# Patient Record
Sex: FEMALE | Race: WHITE | NOT HISPANIC OR LATINO | URBAN - METROPOLITAN AREA
[De-identification: names, ages, dates, MRNs, and addresses within clinical notes are randomized per-mention and may not be internally consistent; named-entity substitution may affect disease eponyms.]

---

## 2019-08-17 ENCOUNTER — INPATIENT (INPATIENT)
Facility: HOSPITAL | Age: 69
LOS: 3 days | Discharge: HOME | DRG: 64 | End: 2019-08-21
Attending: HOSPITALIST | Admitting: HOSPITALIST
Payer: MEDICARE

## 2019-08-17 VITALS
DIASTOLIC BLOOD PRESSURE: 54 MMHG | HEART RATE: 55 BPM | RESPIRATION RATE: 18 BRPM | TEMPERATURE: 98 F | SYSTOLIC BLOOD PRESSURE: 105 MMHG

## 2019-08-17 DIAGNOSIS — I10 ESSENTIAL (PRIMARY) HYPERTENSION: ICD-10-CM

## 2019-08-17 DIAGNOSIS — F20.89 OTHER SCHIZOPHRENIA: ICD-10-CM

## 2019-08-17 DIAGNOSIS — Z95.1 PRESENCE OF AORTOCORONARY BYPASS GRAFT: Chronic | ICD-10-CM

## 2019-08-17 DIAGNOSIS — E78.2 MIXED HYPERLIPIDEMIA: ICD-10-CM

## 2019-08-17 DIAGNOSIS — Z96.649 PRESENCE OF UNSPECIFIED ARTIFICIAL HIP JOINT: Chronic | ICD-10-CM

## 2019-08-17 DIAGNOSIS — I63.89 OTHER CEREBRAL INFARCTION: ICD-10-CM

## 2019-08-17 DIAGNOSIS — I48.0 PAROXYSMAL ATRIAL FIBRILLATION: ICD-10-CM

## 2019-08-17 DIAGNOSIS — G45.9 TRANSIENT CEREBRAL ISCHEMIC ATTACK, UNSPECIFIED: ICD-10-CM

## 2019-08-17 DIAGNOSIS — E03.9 HYPOTHYROIDISM, UNSPECIFIED: ICD-10-CM

## 2019-08-17 DIAGNOSIS — F31.9 BIPOLAR DISORDER, UNSPECIFIED: ICD-10-CM

## 2019-08-17 DIAGNOSIS — J15.9 UNSPECIFIED BACTERIAL PNEUMONIA: ICD-10-CM

## 2019-08-17 LAB
ALBUMIN SERPL ELPH-MCNC: 4.3 G/DL — SIGNIFICANT CHANGE UP (ref 3.3–5.2)
ALP SERPL-CCNC: 51 U/L — SIGNIFICANT CHANGE UP (ref 40–120)
ALT FLD-CCNC: 13 U/L — SIGNIFICANT CHANGE UP
ANION GAP SERPL CALC-SCNC: 13 MMOL/L — SIGNIFICANT CHANGE UP (ref 5–17)
APTT BLD: 36.7 SEC — HIGH (ref 27.5–36.3)
AST SERPL-CCNC: 15 U/L — SIGNIFICANT CHANGE UP
BASOPHILS # BLD AUTO: 0.09 K/UL — SIGNIFICANT CHANGE UP (ref 0–0.2)
BASOPHILS NFR BLD AUTO: 0.8 % — SIGNIFICANT CHANGE UP (ref 0–2)
BILIRUB SERPL-MCNC: 0.5 MG/DL — SIGNIFICANT CHANGE UP (ref 0.4–2)
BUN SERPL-MCNC: 22 MG/DL — HIGH (ref 8–20)
CALCIUM SERPL-MCNC: 10.1 MG/DL — SIGNIFICANT CHANGE UP (ref 8.6–10.2)
CHLORIDE SERPL-SCNC: 101 MMOL/L — SIGNIFICANT CHANGE UP (ref 98–107)
CO2 SERPL-SCNC: 23 MMOL/L — SIGNIFICANT CHANGE UP (ref 22–29)
CREAT SERPL-MCNC: 0.6 MG/DL — SIGNIFICANT CHANGE UP (ref 0.5–1.3)
EOSINOPHIL # BLD AUTO: 0.3 K/UL — SIGNIFICANT CHANGE UP (ref 0–0.5)
EOSINOPHIL NFR BLD AUTO: 2.5 % — SIGNIFICANT CHANGE UP (ref 0–6)
GLUCOSE SERPL-MCNC: 129 MG/DL — HIGH (ref 70–115)
HBA1C BLD-MCNC: 5.9 % — HIGH (ref 4–5.6)
HCT VFR BLD CALC: 42.4 % — SIGNIFICANT CHANGE UP (ref 34.5–45)
HGB BLD-MCNC: 13 G/DL — SIGNIFICANT CHANGE UP (ref 11.5–15.5)
IMM GRANULOCYTES NFR BLD AUTO: 0.6 % — SIGNIFICANT CHANGE UP (ref 0–1.5)
INR BLD: 1.11 RATIO — SIGNIFICANT CHANGE UP (ref 0.88–1.16)
LYMPHOCYTES # BLD AUTO: 1.34 K/UL — SIGNIFICANT CHANGE UP (ref 1–3.3)
LYMPHOCYTES # BLD AUTO: 11.3 % — LOW (ref 13–44)
MCHC RBC-ENTMCNC: 26.1 PG — LOW (ref 27–34)
MCHC RBC-ENTMCNC: 30.7 GM/DL — LOW (ref 32–36)
MCV RBC AUTO: 85 FL — SIGNIFICANT CHANGE UP (ref 80–100)
MONOCYTES # BLD AUTO: 0.63 K/UL — SIGNIFICANT CHANGE UP (ref 0–0.9)
MONOCYTES NFR BLD AUTO: 5.3 % — SIGNIFICANT CHANGE UP (ref 2–14)
NEUTROPHILS # BLD AUTO: 9.45 K/UL — HIGH (ref 1.8–7.4)
NEUTROPHILS NFR BLD AUTO: 79.5 % — HIGH (ref 43–77)
PLATELET # BLD AUTO: 259 K/UL — SIGNIFICANT CHANGE UP (ref 150–400)
POTASSIUM SERPL-MCNC: 4.2 MMOL/L — SIGNIFICANT CHANGE UP (ref 3.5–5.3)
POTASSIUM SERPL-SCNC: 4.2 MMOL/L — SIGNIFICANT CHANGE UP (ref 3.5–5.3)
PROT SERPL-MCNC: 7.8 G/DL — SIGNIFICANT CHANGE UP (ref 6.6–8.7)
PROTHROM AB SERPL-ACNC: 12.8 SEC — SIGNIFICANT CHANGE UP (ref 10–12.9)
RBC # BLD: 4.99 M/UL — SIGNIFICANT CHANGE UP (ref 3.8–5.2)
RBC # FLD: 14.9 % — HIGH (ref 10.3–14.5)
SODIUM SERPL-SCNC: 137 MMOL/L — SIGNIFICANT CHANGE UP (ref 135–145)
WBC # BLD: 11.88 K/UL — HIGH (ref 3.8–10.5)
WBC # FLD AUTO: 11.88 K/UL — HIGH (ref 3.8–10.5)

## 2019-08-17 PROCEDURE — 93010 ELECTROCARDIOGRAM REPORT: CPT

## 2019-08-17 PROCEDURE — 99291 CRITICAL CARE FIRST HOUR: CPT

## 2019-08-17 PROCEDURE — 70450 CT HEAD/BRAIN W/O DYE: CPT | Mod: 26,59

## 2019-08-17 PROCEDURE — 99223 1ST HOSP IP/OBS HIGH 75: CPT | Mod: AI

## 2019-08-17 PROCEDURE — 70496 CT ANGIOGRAPHY HEAD: CPT | Mod: 26

## 2019-08-17 PROCEDURE — 70498 CT ANGIOGRAPHY NECK: CPT | Mod: 26

## 2019-08-17 RX ORDER — ASPIRIN/CALCIUM CARB/MAGNESIUM 324 MG
325 TABLET ORAL ONCE
Refills: 0 | Status: COMPLETED | OUTPATIENT
Start: 2019-08-17 | End: 2019-08-17

## 2019-08-17 RX ORDER — LEVOTHYROXINE SODIUM 125 MCG
50 TABLET ORAL DAILY
Refills: 0 | Status: DISCONTINUED | OUTPATIENT
Start: 2019-08-17 | End: 2019-08-21

## 2019-08-17 RX ORDER — ONDANSETRON 8 MG/1
8 TABLET, FILM COATED ORAL ONCE
Refills: 0 | Status: COMPLETED | OUTPATIENT
Start: 2019-08-17 | End: 2019-08-17

## 2019-08-17 RX ORDER — ALTEPLASE 100 MG
5.9 KIT INTRAVENOUS ONCE
Refills: 0 | Status: DISCONTINUED | OUTPATIENT
Start: 2019-08-17 | End: 2019-08-17

## 2019-08-17 RX ORDER — ATORVASTATIN CALCIUM 80 MG/1
40 TABLET, FILM COATED ORAL AT BEDTIME
Refills: 0 | Status: DISCONTINUED | OUTPATIENT
Start: 2019-08-17 | End: 2019-08-21

## 2019-08-17 RX ORDER — CEFTRIAXONE 500 MG/1
1000 INJECTION, POWDER, FOR SOLUTION INTRAMUSCULAR; INTRAVENOUS EVERY 24 HOURS
Refills: 0 | Status: DISCONTINUED | OUTPATIENT
Start: 2019-08-17 | End: 2019-08-21

## 2019-08-17 RX ORDER — LATANOPROST 0.05 MG/ML
1 SOLUTION/ DROPS OPHTHALMIC; TOPICAL AT BEDTIME
Refills: 0 | Status: DISCONTINUED | OUTPATIENT
Start: 2019-08-17 | End: 2019-08-21

## 2019-08-17 RX ORDER — ALTEPLASE 100 MG
53.1 KIT INTRAVENOUS ONCE
Refills: 0 | Status: DISCONTINUED | OUTPATIENT
Start: 2019-08-17 | End: 2019-08-17

## 2019-08-17 RX ORDER — AZITHROMYCIN 500 MG/1
500 TABLET, FILM COATED ORAL EVERY 24 HOURS
Refills: 0 | Status: DISCONTINUED | OUTPATIENT
Start: 2019-08-17 | End: 2019-08-21

## 2019-08-17 RX ORDER — ASPIRIN/CALCIUM CARB/MAGNESIUM 324 MG
300 TABLET ORAL DAILY
Refills: 0 | Status: DISCONTINUED | OUTPATIENT
Start: 2019-08-17 | End: 2019-08-20

## 2019-08-17 RX ORDER — METOPROLOL TARTRATE 50 MG
100 TABLET ORAL DAILY
Refills: 0 | Status: DISCONTINUED | OUTPATIENT
Start: 2019-08-17 | End: 2019-08-17

## 2019-08-17 RX ADMIN — ATORVASTATIN CALCIUM 40 MILLIGRAM(S): 80 TABLET, FILM COATED ORAL at 21:27

## 2019-08-17 RX ADMIN — AZITHROMYCIN 255 MILLIGRAM(S): 500 TABLET, FILM COATED ORAL at 21:27

## 2019-08-17 RX ADMIN — LATANOPROST 1 DROP(S): 0.05 SOLUTION/ DROPS OPHTHALMIC; TOPICAL at 21:26

## 2019-08-17 RX ADMIN — ONDANSETRON 8 MILLIGRAM(S): 8 TABLET, FILM COATED ORAL at 09:48

## 2019-08-17 RX ADMIN — CEFTRIAXONE 100 MILLIGRAM(S): 500 INJECTION, POWDER, FOR SOLUTION INTRAMUSCULAR; INTRAVENOUS at 20:34

## 2019-08-17 RX ADMIN — Medication 325 MILLIGRAM(S): at 10:00

## 2019-08-17 NOTE — H&P ADULT - NSICDXPASTMEDICALHX_GEN_ALL_CORE_FT
PAST MEDICAL HISTORY:  Bipolar 1 disorder     Hyperlipidemia     Hypokalemia     Hypothyroid     Intrinsic eczema     Muscle weakness (generalized)     Schizophrenia

## 2019-08-17 NOTE — H&P ADULT - ASSESSMENT
68 yr female with mental retardation , CAD, CABG, arthritis  presenting with sudden onset slurred speech, mental status changes that resolved and was found to be in AFIB 68 yr female Bipolar and schizophrenia  , CAD, CABG, Hypertension , arthritis  presenting with sudden onset slurred speech, mental status changes that resolved and was found to be in AFIB

## 2019-08-17 NOTE — ED ADULT NURSE NOTE - ED STAT RN HANDOFF DETAILS
report to ZECHARIAH Rascon.  Pt transported to bed A17L and placed on cardiac monitor with continuous pulse ox. family at bedside.

## 2019-08-17 NOTE — ED PROVIDER NOTE - CLINICAL SUMMARY MEDICAL DECISION MAKING FREE TEXT BOX
The patient presents with slurred speech from 7 am but resolved by the time the patient arrived to ED and according her sister the patient is at baseline and stroke Neurologist recommend no TPA and will admit for TIA

## 2019-08-17 NOTE — H&P ADULT - HISTORY OF PRESENT ILLNESS
68 yr female with mental retardation resident of nursing home  presented with sudden onset slurring of speech started 7 am in the morning of admission and resolved by the time she came to the ED.  Patient is legally blind , has debilitating arthritis that  has confined her to a wheelchair, CAD with CABG 2008, a hysterectomy for uterine cancer.  In ED was found to be in Afib- new onset , Leukocytosis 11.8. CTAngio showing tree in bud opacity of right upper lung concerning for infection or pneumonia.  Head CT show no acute CVA or hemorrhage.  CTA head showing multifocal stenosis intracranial arteries. 75% right carotid bulb stenosis. 68 yr female with Bipolar and schizophrenia , resident of  nursing home  presented with sudden onset slurring of speech started 7 am in the morning of admission and resolved by the time she came to the ED.  Patient is legally blind , has debilitating arthritis that  has confined her to a wheelchair, CAD with CABG 2008, Hypertension, a hysterectomy for uterine cancer.  In ED was found to be in Afib- new onset , Leukocytosis 11.8. CTAngio showing tree in bud opacity of right upper lung concerning for infection or pneumonia.  Head CT show no acute CVA or hemorrhage.  CTA head showing multifocal stenosis intracranial arteries. 75% right carotid bulb stenosis.

## 2019-08-17 NOTE — ED ADULT NURSE NOTE - OBJECTIVE STATEMENT
assumed pt care as critical care RN called to code stroke.  pt from [facility] states she had difficulty speaking at 0700 this morning.  No other deficits.  Pt is legally blind.  Pt arrives vomiting bile with right sided facial droop and slurred speech.  Patient's sister arrived to confirm her speech is worse than normal.  During video neurology consult, patients stroke symptoms resolved.  No TPA given.  Pt vomited 3X since arrival.

## 2019-08-17 NOTE — ED ADULT NURSE NOTE - INTERVENTIONS DEFINITIONS
Non-slip footwear when patient is off stretcher/Provide visual clues: red socks/Stretcher in lowest position, wheels locked, appropriate side rails in place

## 2019-08-17 NOTE — ED PROVIDER NOTE - PROGRESS NOTE DETAILS
Sister by bedside stating that the patient is at baseline speech.  Code stroke Neurologist recommend No TPA at this time and admit for TIA Sister by bedside stating that the patient is at baseline speech.  Code stroke Neurologist Dr Garzon recommend No TPA at this time and admit for TIA

## 2019-08-17 NOTE — ED ADULT NURSE REASSESSMENT NOTE - NS ED NURSE REASSESS COMMENT FT1
pt a+ox3, resting comfortably in bed. pt in no apparent distress or discomfort, Afib on monitor. will continue to monitor.

## 2019-08-17 NOTE — ED ADULT NURSE REASSESSMENT NOTE - NS ED NURSE REASSESS COMMENT FT1
Pt remains alert and oriented X 3 with no slurred speech, facial droop or weakness since resolution of symptoms shortly after arrival.  Vital signs stable.  A fib on monitor.  Family at bedside.

## 2019-08-17 NOTE — ED ADULT NURSE NOTE - PMH
Bipolar 1 disorder    Hyperlipidemia    Hypokalemia    Hypothyroid    Schizophrenia Bipolar 1 disorder    Hyperlipidemia    Hypokalemia    Hypothyroid    Intrinsic eczema    Muscle weakness (generalized)    Schizophrenia

## 2019-08-17 NOTE — H&P ADULT - PROBLEM SELECTOR PLAN 1
Slurred speech is resolved  Brain CT showing no infarct or hemorrhage  Head CTA showing 75% right carotid stenosis  Aspirin 81 mg daily   Brain MRI  Neurology consult

## 2019-08-17 NOTE — ED PROVIDER NOTE - NEUROLOGICAL, MLM
Alert and oriented, no focal deficits, no motor or sensory deficits. Mild Slurred Speech (but at baseline)

## 2019-08-17 NOTE — ED ADULT NURSE NOTE - CHIEF COMPLAINT QUOTE
pt arrive by ambulance from [facility] with reports of sudden onset of slurred speech, last known well 7am. code stroke activation [id number], MD Peters at bedside for eval.

## 2019-08-17 NOTE — H&P ADULT - PROBLEM SELECTOR PLAN 8
Right lung opacity concerning for gram positive bacterial pneumonia is present on admission.    Leukocytosis  To obtain urine ligionella antigen  Start antibiotic , ceftriaxone and zithromax for presumed community acquired pneumonia.

## 2019-08-17 NOTE — H&P ADULT - NSHPSOCIALHISTORY_GEN_ALL_CORE
assisted since age 20 due to mental retardation  Living in nursing home [facility] since age 20 due to mental illness  Living in nursing home

## 2019-08-17 NOTE — ED ADULT NURSE REASSESSMENT NOTE - NS ED NURSE REASSESS COMMENT FT1
assumed care of pt @ this time. pt a+ox3, in no apparent distress or discomfort. pt denies any pain, requesting something to eat. VSS, Afib on monitor @ 63. will continue to monitor and assess.

## 2019-08-17 NOTE — ED ADULT TRIAGE NOTE - CHIEF COMPLAINT QUOTE
pt arrive by ambulance from [facility] with reports of sudden onset of slurred speech, last known well 7am. code stroke activation [id number], MD Peters at bedside for eval.

## 2019-08-17 NOTE — ED ADULT NURSE REASSESSMENT NOTE - COMFORT CARE
side rails up/darkened lights/repositioned/plan of care explained
meal provided/darkened lights/side rails up/po fluids offered/repositioned/plan of care explained

## 2019-08-17 NOTE — ED PROVIDER NOTE - OBJECTIVE STATEMENT
The patient is a 68 year old female presents with slurred speech from [facility] and the staff noticed slurred speech at 7 am  No CP, No SOB  Legally blind at birth

## 2019-08-17 NOTE — ED ADULT NURSE REASSESSMENT NOTE - NS ED NURSE REASSESS COMMENT FT1
daughter witnessed pt wake up from sleep, stated someone was asking her questions, and then she  a "blank stare" which lasted approximately 10 seconds.  Pt became alert and oriented X 3 with no slurred speech, no deficits, no complaints immediately after the blank stare.  Dr. Peters made aware of event.

## 2019-08-18 LAB
CHOLEST SERPL-MCNC: 106 MG/DL — LOW (ref 110–199)
HCV AB S/CO SERPL IA: 0.13 S/CO — SIGNIFICANT CHANGE UP (ref 0–0.99)
HCV AB SERPL-IMP: SIGNIFICANT CHANGE UP
HDLC SERPL-MCNC: 34 MG/DL — LOW
LIPID PNL WITH DIRECT LDL SERPL: 58 MG/DL — SIGNIFICANT CHANGE UP
TOTAL CHOLESTEROL/HDL RATIO MEASUREMENT: 3 RATIO — LOW (ref 3.3–7.1)
TRIGL SERPL-MCNC: 72 MG/DL — SIGNIFICANT CHANGE UP (ref 10–200)
TSH SERPL-MCNC: 0.67 UIU/ML — SIGNIFICANT CHANGE UP (ref 0.27–4.2)

## 2019-08-18 PROCEDURE — 93010 ELECTROCARDIOGRAM REPORT: CPT

## 2019-08-18 PROCEDURE — 99223 1ST HOSP IP/OBS HIGH 75: CPT

## 2019-08-18 PROCEDURE — 99233 SBSQ HOSP IP/OBS HIGH 50: CPT

## 2019-08-18 RX ORDER — THIOTHIXENE 5 MG
4 CAPSULE ORAL
Refills: 0 | Status: DISCONTINUED | OUTPATIENT
Start: 2019-08-18 | End: 2019-08-21

## 2019-08-18 RX ADMIN — CEFTRIAXONE 100 MILLIGRAM(S): 500 INJECTION, POWDER, FOR SOLUTION INTRAMUSCULAR; INTRAVENOUS at 20:30

## 2019-08-18 RX ADMIN — Medication 50 MICROGRAM(S): at 05:59

## 2019-08-18 RX ADMIN — AZITHROMYCIN 255 MILLIGRAM(S): 500 TABLET, FILM COATED ORAL at 22:41

## 2019-08-18 RX ADMIN — Medication 4 MILLIGRAM(S): at 15:16

## 2019-08-18 RX ADMIN — ATORVASTATIN CALCIUM 40 MILLIGRAM(S): 80 TABLET, FILM COATED ORAL at 22:43

## 2019-08-18 RX ADMIN — LATANOPROST 1 DROP(S): 0.05 SOLUTION/ DROPS OPHTHALMIC; TOPICAL at 22:35

## 2019-08-18 NOTE — ED ADULT NURSE REASSESSMENT NOTE - NS ED NURSE REASSESS COMMENT FT1
patient aox3 comfortable in appearance patient requesting coffee and oreos. states she feels hungry denies pain denies cp denies numbness denies tingline ROSARIO. bed bath performed. MD jaime contacted reguarding improper order and low H/H 6.8 hgb, 22.4 hct patient aox3 comfortable in appearance patient requesting coffee and oreos. states she feels hungry denies pain denies cp denies numbness denies tingline ROSARIO. bed bath performed. MD jaime contacted reguarding improper order

## 2019-08-18 NOTE — ED ADULT NURSE REASSESSMENT NOTE - NS ED NURSE REASSESS COMMENT FT1
patient family concerned about medication for schizophrenia not being given, called [facility] spoke to Jayjay from [facility] who sent patient, she confirmed medication that was not listed. contacted MD ani regarding med rec. and new onset Afib controlled rate, pt to have cardiology consult

## 2019-08-18 NOTE — CONSULT NOTE ADULT - ASSESSMENT
The patient is a 68y Female who is followed by neurology because of TIA    TIA  transient dysarthria, back to baseline  MRI brain to r/o CVA  PT eval  ASA  statin  echocardiogram    Lipids  fasting lipid panel LDL=58  at goal,  LDL is under 70  continue lipitor 40 mg  daily    will follow with you    Abdoul Hernandez MD PhD   [id number]

## 2019-08-18 NOTE — CONSULT NOTE ADULT - SUBJECTIVE AND OBJECTIVE BOX
[facility]                                     Neurology at [place]                                 David Braun, & Yassine                                  [address]                                        [place], [place], [address]                                             [phone]    CC: transient dysarthria  HPI: The patient is a 68y Female who presented with dysarthria that has since resolved.  It started at 0700 8/17, she came to ED was evaluated by telestroke and alteplase was not given due to resolved symptoms and unclear time of onset.  She is at her baseline currently.  She denied any weakness or numbness or dizzines at the time of dysarthria.  Neuro eval is requested.    PAST MEDICAL & SURGICAL HISTORY:  Intrinsic eczema  Muscle weakness (generalized)  Hyperlipidemia  Hypokalemia  Hypothyroid  Schizophrenia  Bipolar 1 disorder  History of hip replacement  S/P CABG x 3      MEDICATIONS  (STANDING):  aspirin Suppository 300 milliGRAM(s) Rectal daily  atorvastatin 40 milliGRAM(s) Oral at bedtime  azithromycin  IVPB 500 milliGRAM(s) IV Intermittent every 24 hours  cefTRIAXone   IVPB 1000 milliGRAM(s) IV Intermittent every 24 hours  latanoprost 0.005% Ophthalmic Solution 1 Drop(s) Both EYES at bedtime  levothyroxine 50 MICROGram(s) Oral daily    MEDICATIONS  (PRN):      Allergies    bacitracin (Unknown)  neomycin (Unknown)  Neosporin (Unknown)  polymyxin B ophthalmic (Unknown)  tobramycin (Unknown)    Intolerances        SOCIAL HISTORY:  no tob,   no alcohol   no drugs    FAMILY HISTORY:  No pertinent family history in first degree relatives      ROS: 14 point ROS negative other than what is present in HPI or below  transient dysarthria    Vital Signs Last 24 Hrs  T(C): 36.8 (18 Aug 2019 11:29), Max: 37.1 (18 Aug 2019 00:44)  T(F): 98.2 (18 Aug 2019 11:29), Max: 98.7 (18 Aug 2019 00:44)  HR: 68 (18 Aug 2019 11:29) (63 - 68)  BP: 108/69 (18 Aug 2019 11:29) (108/69 - 133/66)  BP(mean): --  RR: 18 (18 Aug 2019 11:29) (16 - 19)  SpO2: 97% (18 Aug 2019 11:29) (97% - 99%)      General: NAD    Detailed Neurologic Exam:    Mental status: The patient is awake and alert and has normal attention span.  The patient is fully oriented in 3 spheres. The patient is oriented to current events. The patient is able to name objects, follow commands, repeat sentences.    Cranial nerves: Pupils equal and react symmetrically to light. There is no visual field deficit to confrontation. Extraocular motion is full with no nystagmus. There is no ptosis. Facial sensation is intact. Facial musculature is symmetric. Palate elevates symmetrically. Shoulder shrug is normal. Tongue is midline.    Motor: There is normal bulk and tone.  There is no tremor.  diffuse generalized weakness    Sensation: Intact to light touch and pin in 4 extremities    Reflexes: 1+ throughout and plantar responses are flexor.    Cerebellar: There is no dysmetria on finger to nose testing.    Gait : deferred    LABS:                         13.0   11.88 )-----------( 259      ( 17 Aug 2019 09:17 )             42.4       08-17    137  |  101  |  22.0<H>  ----------------------------<  129<H>  4.2   |  23.0  |  0.60    Ca    10.1      17 Aug 2019 09:17    TPro  7.8  /  Alb  4.3  /  TBili  0.5  /  DBili  x   /  AST  15  /  ALT  13  /  AlkPhos  51  08-17      PT/INR - ( 17 Aug 2019 09:17 )   PT: 12.8 sec;   INR: 1.11 ratio         PTT - ( 17 Aug 2019 09:17 )  PTT:36.7 sec    Lipid Profile (08.18.19 @ 07:26)    Total Cholesterol/HDL Ratio Measurement: 3.0 Ratio    Cholesterol, Serum: 106 mg/dL    Triglycerides, Serum: 72 mg/dL    HDL Cholesterol, Serum: 34: HDL Levels >/= 60 mg/dL are considered beneficial and a "negative" risk  factor.  Effective 08/15/2018: New reference range and interpretive comment. mg/dL    Direct LDL: 58:         RADIOLOGY & ADDITIONAL STUDIES (independently reviewed unless otherwise noted):  head CT- no acute CVA, mass or blood.  (+) SVID and atrophy  CTA head- no LVO, AVM or aneurysm.  multifocal mid to moderate stenosis and plaque  CTA neck- 75% stenosis right ICA

## 2019-08-18 NOTE — CONSULT NOTE ADULT - SUBJECTIVE AND OBJECTIVE BOX
[facility]                                                        Office: [address] [address]                                                       Telephone: [phone]. Fax:[phone]                                                              CARDIOLOGY CONSULTATION NOTE                                                                                             Consult requested by:  Dr. Gomez  Reason for Consultation: Afib  History obtained by: Patient and medical record   obtained: No    Chief complaint:    Patient is a 68y old  Female who presents with a chief complaint of Slurred speech (18 Aug 2019 13:58)      HPI:  68 yr female with Bipolar and schizophrenia , resident of  nursing home  presented with sudden onset slurring of speech started 7 am in the morning of admission and resolved by the time she came to the ED.  Patient is legally blind , has debilitating arthritis that  has confined her to a wheelchair, CAD with CABG 2008, Hypertension, a hysterectomy for uterine cancer.  In ED was found to be in Afib- new onset , Leukocytosis 11.8. CT Angio showing tree in bud opacity of right upper lung concerning for infection or pneumonia.  Head CT show no acute CVA or hemorrhage.  CTA head showing multifocal stenosis intracranial arteries. 75% right carotid bulb stenosis. (17 Aug 2019 16:43)  Appreciate above HPI    Pt seen and examined in ED; Pt states bypass in 2008- "[facility]" can not recall surgeon. Has not seen cardiologist, lives at [facility] nursing home Denies dizziness, lightheaded, syncope, near syncope, palpitations, chest pain/pressure.          REVIEW OF SYMPTOMS: Cardiovascular:  See HPI. No chest pain, No dyspnea, No syncope, No palpitations, No dizziness, No Orthopnea, No Paroxsymal nocturnal dyspnea; Respiratory: No Dyspnea, No cough, Genitourinary:  No dysuria, no hematuria; Gastrointestinal:  No nausea, no vomiting. No diarrhea. No abdominal pain. No dark color stool, no melena ; Neurological: No headache, no dizziness, no slurred speech;  Psychiatric: No agitation, no anxiety.  ALL OTHER REVIEW OF SYSTEMS ARE NEGATIVE.    ALLERGIES: Allergies    bacitracin (Unknown)  neomycin (Unknown)  Neosporin (Unknown)  polymyxin B ophthalmic (Unknown)  tobramycin (Unknown)    Intolerances    CURRENT MEDICATIONS:  aspirin Suppository  thiothixene  atorvastatin  azithromycin  IVPB  cefTRIAXone   IVPB  latanoprost 0.005% Ophthalmic Solution  levothyroxine      HOME MEDICATIONS:    PAST MEDICAL HISTORY  Intrinsic eczema  Muscle weakness (generalized)  Hyperlipidemia  Hypokalemia  Hypothyroid  Schizophrenia  Bipolar 1 disorder      PAST SURGICAL HISTORY  History of hip replacement  S/P CABG x 3      FAMILY HISTORY:  No pertinent family history in first degree relatives      SOCIAL HISTORY:  Denies smoking/alcohol/drugs      Vital Signs Last 24 Hrs  T(C): 36.8 (18 Aug 2019 11:29), Max: 37.1 (18 Aug 2019 00:44)  T(F): 98.2 (18 Aug 2019 11:29), Max: 98.7 (18 Aug 2019 00:44)  HR: 68 (18 Aug 2019 11:29) (63 - 68)  BP: 108/69 (18 Aug 2019 11:29) (108/69 - 133/66)  RR: 18 (18 Aug 2019 11:29) (16 - 19)  SpO2: 97% (18 Aug 2019 11:29) (97% - 99%)      PHYSICAL EXAM:  Constitutional: Comfortable . No acute distress.   HEENT: Atraumatic and normcephalic , neck is supple . no JVD. No carotid bruit. PEERL   CNS: A&Ox3. Legally blind  Lymph Nodes: Cervical : Not palpable.  Respiratory: CTAB   Cardiovascular: S1S2 RRR. No murmur/rubs or gallop.  Gastrointestinal: Soft non-tender and non distended . +Bowel sounds. negative Gill's sign.  Extremities: No edema.   Psychiatric: Calm . no agitation.  Skin: No skin rash/ulcers visualized to face, hands or feet.    Intake and output:     LABS:                        13.0   11.88 )-----------( 259      ( 17 Aug 2019 09:17 )             42.4     08-17    137  |  101  |  22.0<H>  ----------------------------<  129<H>  4.2   |  23.0  |  0.60    Ca    10.1      17 Aug 2019 09:17    TPro  7.8  /  Alb  4.3  /  TBili  0.5  /  DBili  x   /  AST  15  /  ALT  13  /  AlkPhos  51  08-17      PT/INR - ( 17 Aug 2019 09:17 )   PT: 12.8 sec;   INR: 1.11 ratio    PTT - ( 17 Aug 2019 09:17 )  PTT:36.7 sec      INTERPRETATION OF TELEMETRY: Reviewed by me.   ECG: Afib, anterior infarct, inferior ischemia     RADIOLOGY & ADDITIONAL STUDIES:    X-ray:  reviewed by me.   CT scan:   < from: CT Angio Neck w/ IV Cont (08.17.19 @ 10:26) >  FINDINGS:     CT ANGIOGRAPHY NECK:     Thoracic aorta and branch vessels: Patent.    Dilated main pulmonary artery measuring 3.6 cm, which can be seen in the   setting of pulmonary arterial hypertension.    Right carotid system: Severe calcified atherosclerotic plaque at the   right carotid bulb and proximal right cervical ICA. Approximately 75%   stenosis of the proximal right cervical ICA by NASCET criteria. No   evidence of dissection.    Left carotid system: Calcified plaque in the left carotid bulb and   proximal cervical ICA. No hemodynamically significant stenosis by NASCET   criteria. No evidence of dissection.    Vertebral arteries: No focal stenosis or dissection.     Soft tissues of the neck: Fatty atrophy of the bilateral parotid glands.   Bilateral submandibular glands not well seen, which may reflect fatty   atrophy. Enlarged 1.6 cm left level 2A lymph node, nonspecific (5:336).    Visualized spine: Degenerative changes in the spine.    Visualized upper chest: Tree-in-bud opacities in the right upper lung   suggesting infection.    CT ANGIOGRAPHY BRAIN:    Internal carotid arteries: Calcified plaque in the bilateral cavernous   and supraclinoid ICAs resulting in mild to moderate narrowing.    Anterior cerebral arteries: Moderate stenosis of the left A2 segment.    Middle cerebral arteries: Two vessels arising from the distal left ICA   and coursing through the left MCA cistern suggesting duplicated MCA.   Multifocal moderate stenoses of both M1 segments, greater in the more   inferior segment. Mild calcified plaque in the right M1 segment.    Posterior cerebral arteries: Hypoplastic bilateral P1 segments with fetal   origin of the bilateral PCAs.    Vertebrobasilar: Mild calcified plaque in bilateral proximal intradural   vertebral arteries. Moderate stenosis of proximal basilar artery.     Vascular lesions: No evidence of intracranial aneurysm or large vascular   malformation, within limits of CT technique.    IMPRESSION:   CT angiography neck:   1.  Severe calcifiedatherosclerotic plaque at the right carotid bulb and   proximal right cervical ICA. Approximately 75% stenosis of the proximal   right cervical ICA by NASCET criteria.   2.  Enlarged 1.6 cm left level 2A lymph node, nonspecific.  3.  Tree-in-bud opacities in the right upper lung suggesting infection.    CT angiography brain:   1.  No major vessel occlusion. No evidence of aneurysm.  2.  Multifocal stenoses of intracranial arteries as described.    CARLTON HERNÁNDEZ   This document has been electronically signed. Aug 17 2019 11:02AM    < end of copied text >

## 2019-08-18 NOTE — CONSULT NOTE ADULT - ATTENDING COMMENTS
Pt p/w new-onset asymptomatic AF of unknown duration in setting of TIA. CHADS-VASC =5. Will need OAC once cleared by neurology. AF is rate controlled for now- will plan for VAHID/DCCV prior to d/c to attempt NSR restoration given this is a new diagnosis. F/u TTE.

## 2019-08-18 NOTE — PHYSICAL THERAPY INITIAL EVALUATION ADULT - GENERAL OBSERVATIONS, REHAB EVAL
Pt. greeted resting on stretcher in ED with sister at bedside, + monitor, O2 via NC, agreeable to PT

## 2019-08-18 NOTE — CONSULT NOTE ADULT - ASSESSMENT
68 yr female with Bipolar and schizophrenia , resident of  nursing home  presented with sudden onset slurring of speech started 7 am in the morning of admission and resolved by the time she came to the ED.  Patient is legally blind , has debilitating arthritis that  has confined her to a wheelchair, CAD with CABG 2008, Hypertension, a hysterectomy for uterine cancer.  In ED was found to be in Afib- new onset , Leukocytosis 11.8. CT Angio showing tree in bud opacity of right upper lung concerning for infection or pneumonia.  Head CT show no acute CVA or hemorrhage.  CTA head showing multifocal stenosis intracranial arteries. 75% right carotid bulb stenosis.     Afib  - unknown onest- new to pt history  - TTE pending  - Plan for possible TTE cardioversion this hospitalization, pending neurology clearance to start on AC  - MRI pending  - continue statin     HTN  - hold antihypertensives for now. Permissive HTN to     HLD  - continue statin

## 2019-08-18 NOTE — PHYSICAL THERAPY INITIAL EVALUATION ADULT - ADDITIONAL COMMENTS
Pt. is a long term resident of [facility]. Pt. reports she ambulated with a RW and supervision and lately has not been amb as much, just from bed <-> W/c with a RW and supervision. Pt. does not need to negotiate stairs and has assist as needed.

## 2019-08-18 NOTE — PROGRESS NOTE ADULT - ASSESSMENT
68 yr female Bipolar and schizophrenia  , CAD, CABG, Hypertension , arthritis  presenting with sudden onset slurred speech, mental status changes that resolved and was found to be in AFIB     Problem/Plan - 1:  ·  Problem: Cerebrovascular accident (CVA) due to other mechanism.  Plan: Slurred speech is resolved  Brain CT showing no infarct or hemorrhage  Head CTA showing 75% right carotid stenosis  Aspirin 81 mg daily   Brain MRI  Neurology consult is following      Problem/Plan - 2:  ·  Problem: Paroxysmal atrial fibrillation.  Plan: Echo   Pt/INR  Cardiology consult.      Problem/Plan - 3:  ·  Problem: Other schizophrenia.  Plan: Thiothixene 4mg po daily      Problem/Plan - 4:  ·  Problem: Bipolar 1 disorder.  Plan: stable .      Problem/Plan - 5:  ·  Problem: Acquired hypothyroidism.  Plan: levothyroxine 50mcg po daily.      Problem/Plan - 6:  Problem: Mixed hyperlipidemia. Plan: atorvastatin 40mg po daily.     Problem/Plan - 7:  ·  Problem: Essential hypertension.  Plan: Hold bp meds for SBP less than 180 for 48 hours.      Problem/Plan - 8:  ·  Problem: Pneumonia, bacterial.  Plan: Right lung opacity concerning for gram positive bacterial pneumonia is present on admission.    Leukocytosis  To obtain urine ligionella antigen  Start antibiotic , ceftriaxone and zithromax for presumed community acquired pneumonia.

## 2019-08-19 LAB
ANION GAP SERPL CALC-SCNC: 10 MMOL/L — SIGNIFICANT CHANGE UP (ref 5–17)
BUN SERPL-MCNC: 20 MG/DL — SIGNIFICANT CHANGE UP (ref 8–20)
CALCIUM SERPL-MCNC: 9 MG/DL — SIGNIFICANT CHANGE UP (ref 8.6–10.2)
CHLORIDE SERPL-SCNC: 104 MMOL/L — SIGNIFICANT CHANGE UP (ref 98–107)
CO2 SERPL-SCNC: 26 MMOL/L — SIGNIFICANT CHANGE UP (ref 22–29)
CREAT SERPL-MCNC: 0.6 MG/DL — SIGNIFICANT CHANGE UP (ref 0.5–1.3)
GLUCOSE SERPL-MCNC: 88 MG/DL — SIGNIFICANT CHANGE UP (ref 70–115)
HCT VFR BLD CALC: 37.6 % — SIGNIFICANT CHANGE UP (ref 34.5–45)
HGB BLD-MCNC: 11.7 G/DL — SIGNIFICANT CHANGE UP (ref 11.5–15.5)
MCHC RBC-ENTMCNC: 26.6 PG — LOW (ref 27–34)
MCHC RBC-ENTMCNC: 31.1 GM/DL — LOW (ref 32–36)
MCV RBC AUTO: 85.5 FL — SIGNIFICANT CHANGE UP (ref 80–100)
PLATELET # BLD AUTO: 250 K/UL — SIGNIFICANT CHANGE UP (ref 150–400)
POTASSIUM SERPL-MCNC: 4.2 MMOL/L — SIGNIFICANT CHANGE UP (ref 3.5–5.3)
POTASSIUM SERPL-SCNC: 4.2 MMOL/L — SIGNIFICANT CHANGE UP (ref 3.5–5.3)
RBC # BLD: 4.4 M/UL — SIGNIFICANT CHANGE UP (ref 3.8–5.2)
RBC # FLD: 14.7 % — HIGH (ref 10.3–14.5)
SODIUM SERPL-SCNC: 140 MMOL/L — SIGNIFICANT CHANGE UP (ref 135–145)
WBC # BLD: 7.93 K/UL — SIGNIFICANT CHANGE UP (ref 3.8–10.5)
WBC # FLD AUTO: 7.93 K/UL — SIGNIFICANT CHANGE UP (ref 3.8–10.5)

## 2019-08-19 PROCEDURE — 93306 TTE W/DOPPLER COMPLETE: CPT | Mod: 26

## 2019-08-19 PROCEDURE — 70553 MRI BRAIN STEM W/O & W/DYE: CPT | Mod: 26

## 2019-08-19 PROCEDURE — 93880 EXTRACRANIAL BILAT STUDY: CPT | Mod: 26

## 2019-08-19 PROCEDURE — 99232 SBSQ HOSP IP/OBS MODERATE 35: CPT

## 2019-08-19 PROCEDURE — 99223 1ST HOSP IP/OBS HIGH 75: CPT | Mod: GC

## 2019-08-19 PROCEDURE — 99233 SBSQ HOSP IP/OBS HIGH 50: CPT

## 2019-08-19 PROCEDURE — 71250 CT THORAX DX C-: CPT | Mod: 26

## 2019-08-19 RX ORDER — APIXABAN 2.5 MG/1
5 TABLET, FILM COATED ORAL EVERY 12 HOURS
Refills: 0 | Status: DISCONTINUED | OUTPATIENT
Start: 2019-08-19 | End: 2019-08-21

## 2019-08-19 RX ADMIN — Medication 4 MILLIGRAM(S): at 17:37

## 2019-08-19 RX ADMIN — Medication 300 MILLIGRAM(S): at 16:37

## 2019-08-19 RX ADMIN — APIXABAN 5 MILLIGRAM(S): 2.5 TABLET, FILM COATED ORAL at 17:35

## 2019-08-19 RX ADMIN — Medication 50 MICROGRAM(S): at 06:15

## 2019-08-19 NOTE — OCCUPATIONAL THERAPY INITIAL EVALUATION ADULT - GENERAL OBSERVATIONS, REHAB EVAL
Received pt in semi-longo position in bed, +IV locks, +VCBs, +telemetry, +external female catheter; pt agrees to OT.

## 2019-08-19 NOTE — CONSULT NOTE ADULT - ATTENDING COMMENTS
Patient seen and examined and cased discussed with resident. Agree with recommendations.   Patient with +CVA on MRI. Functionally at her baseline and lives at long term part of [facility] facility.  Recommend return to [facility].    Will sign off, please reconsult for additional rehab dispo needs if functional status changes. Patient seen and examined and cased discussed with resident. Agree with recommendations.   Patient with +CVA on MRI. Functionally at her baseline and lives at long term part of [facility] facility.  Recommend return to [facility].

## 2019-08-19 NOTE — CONSULT NOTE ADULT - SUBJECTIVE AND OBJECTIVE BOX
Vascular Attending:  Giovanny      HPI:  68 yr female with Bipolar and schizophrenia , resident of  nursing home  presented with sudden onset slurring of speech started 7 am in the morning of admission and resolved by the time she came to the ED.  Patient is legally blind , has debilitating arthritis that  has confined her to a wheelchair, CAD with CABG 2008, Hypertension, a hysterectomy for uterine cancer.  In ED was found to be in Afib- new onset , Leukocytosis 11.8. CTAngio showing tree in bud opacity of right upper lung concerning for infection or pneumonia.  Head CT show no acute CVA or hemorrhage.  CTA head showing multifocal stenosis intracranial arteries. 75% right carotid bulb stenosis. (17 Aug 2019 16:43)      Vascular Surgery HPI;  Admission HPI reviewed.  Patient with multiple comorbidities, resides in a Nursing Facility.   Patient presented to the ed with reported slurred speech.  Patient with New onset A-fib   Follow up CTA head exhibits ~ 75 right ICA stenosis,  MRI Head shows   Small linear acute/subacute infarction involving the right   superior cerebellar hemisphere with associated cytotoxic edema.  Patient's sister at bedside, no past history of tobacco use or known carotid disease.  Vascular surgery requested to evaluate.    PAST MEDICAL & SURGICAL HISTORY:  Intrinsic eczema  Muscle weakness (generalized)  Hyperlipidemia  Hypokalemia  Hypothyroid  Schizophrenia  Bipolar 1 disorder  History of hip replacement  S/P CABG x 3      REVIEW OF SYSTEMS:  See HPI         MEDICATIONS  (STANDING):  aspirin Suppository 300 milliGRAM(s) Rectal daily  atorvastatin 40 milliGRAM(s) Oral at bedtime  azithromycin  IVPB 500 milliGRAM(s) IV Intermittent every 24 hours  cefTRIAXone   IVPB 1000 milliGRAM(s) IV Intermittent every 24 hours  latanoprost 0.005% Ophthalmic Solution 1 Drop(s) Both EYES at bedtime  levothyroxine 50 MICROGram(s) Oral daily  thiothixene 4 milliGRAM(s) Oral <User Schedule>    MEDICATIONS  (PRN):      Allergies    bacitracin (Unknown)  neomycin (Unknown)  Neosporin (Unknown)  polymyxin B ophthalmic (Unknown)  tobramycin (Unknown)    Intolerances        SOCIAL HISTORY:  Non contributory       Vital Signs Last 24 Hrs  T(C): 37.2 (19 Aug 2019 08:52), Max: 37.2 (19 Aug 2019 08:52)  T(F): 98.9 (19 Aug 2019 08:52), Max: 98.9 (19 Aug 2019 08:52)  HR: 80 (19 Aug 2019 12:27) (66 - 90)  BP: 133/81 (19 Aug 2019 12:27) (102/50 - 134/73)  BP(mean): 96 (19 Aug 2019 12:27) (76 - 96)  RR: 16 (19 Aug 2019 12:27) (15 - 21)  SpO2: 96% (19 Aug 2019 12:27) (96% - 100%)    PHYSICAL EXAM:        Constitutional:  in bed eating meal.  no distress, no noted speech slurring     Eyes:  no jaundice    ENMT: atraumatic, no facial droop     Neck: supple, no scars       Respiratory: non labored     Cardiovascular: s1,s2    Gastrointestinal: distended, nontender      Extremities: warm, no noted ulcerations, no gross edema, equal strength, upper and lower ext      Vascular: Palpable radial and popliteals     Neurological: no noted gross motor or sensory deficits       Psychiatric:  good affect             LABS:                        11.7   7.93  )-----------( 250      ( 19 Aug 2019 05:52 )             37.6     08-19    140  |  104  |  20.0  ----------------------------<  88  4.2   |  26.0  |  0.60    Ca    9.0      19 Aug 2019 05:52            RADIOLOGY & ADDITIONAL STUDIES    < from: CT Angio Neck w/ IV Cont (08.17.19 @ 10:26) >     EXAM:  CT ANGIO NECK (W)AW IC                         EXAM:  CT ANGIO BRAIN (W)AW IC                          PROCEDURE DATE:  08/17/2019          INTERPRETATION:  EXAMS:  1.  CT ANGIOGRAPHY NECK WITH INTRAVENOUS CONTRAST.  2.  CT ANGIOGRAPHY BRAIN WITH INTRAVENOUS CONTRAST.    CLINICAL HISTORY: stroke. . ADMDIAG1: G45.9 TRANSIENT CEREBRAL ISCHEMIC   ATTACK, UNSPECIFIED/.     TECHNIQUE: Contrast enhanced axial CT images were acquired from the   aortic arch to the vertex of the calvarium, during the angiographic   phase.  Three-dimensional maximum intensity projection reformats were   generated.  96 ml of Omnipaque-350 mg/ml were administered intravenously,   without immediate complication.    COMPARISON STUDY: CT head 8/17/2019    FINDINGS:     CT ANGIOGRAPHY NECK:     Thoracic aorta and branch vessels: Patent.    Dilated main pulmonary artery measuring 3.6 cm, which can be seen in the   setting of pulmonary arterial hypertension.    Right carotid system: Severe calcified atherosclerotic plaque at the   right carotid bulb and proximal right cervical ICA. Approximately 75%   stenosis of the proximal right cervical ICA by NASCET criteria. No   evidence of dissection.    Left carotid system: Calcified plaque in the left carotid bulb and   proximal cervical ICA. No hemodynamically significant stenosis by NASCET   criteria. No evidence of dissection.    Vertebral arteries: No focal stenosis or dissection.     Soft tissues of the neck: Fatty atrophy of the bilateral parotid glands.   Bilateral submandibular glands not well seen, which may reflect fatty   atrophy. Enlarged 1.6 cm left level 2A lymph node, nonspecific (5:336).    Visualized spine: Degenerative changes in the spine.    Visualized upper chest: Tree-in-bud opacities in the right upper lung   suggesting infection.    CT ANGIOGRAPHY BRAIN:    Internal carotid arteries: Calcified plaque in the bilateral cavernous   and supraclinoid ICAs resulting in mild to moderate narrowing.    Anterior cerebral arteries: Moderate stenosis of the left A2 segment.    Middle cerebral arteries: Two vessels arising from the distal left ICA   and coursing through the left MCA cistern suggesting duplicated MCA.   Multifocal moderate stenoses of both M1 segments, greater in the more   inferior segment. Mild calcified plaque in the right M1 segment.    Posterior cerebral arteries: Hypoplastic bilateral P1 segments with fetal   origin of the bilateral PCAs.    Vertebrobasilar: Mild calcified plaque in bilateral proximal intradural   vertebral arteries. Moderate stenosis of proximal basilar artery.     Vascular lesions: No evidence of intracranial aneurysm or large vascular   malformation, within limits of CT technique.    IMPRESSION:   CT angiography neck:   1.  Severe calcifiedatherosclerotic plaque at the right carotid bulb and   proximal right cervical ICA. Approximately 75% stenosis of the proximal   right cervical ICA by NASCET criteria.   2.  Enlarged 1.6 cm left level 2A lymph node, nonspecific.  3.  Tree-in-bud opacities in the right upper lung suggesting infection.    CT angiography brain:   1.  No major vessel occlusion. No evidence of aneurysm.  2.  Multifocal stenoses of intracranial arteries as described.                CARLTON HERNÁNDEZ   This document has been electronically signed. Aug 17 2019 11:02AM                  < end of copied text >  < from: MR Head w/wo IV Cont (08.19.19 @ 11:01) >     EXAM:  MR BRAIN WAW IC                          PROCEDURE DATE:  08/19/2019          INTERPRETATION:  .    CLINICAL INFORMATION: Slurred speech. Transient cerebral ischemic attack.    TECHNIQUE: Multiplanar multisequential MRI of the brain was acquired with   and without the administration of IV gadolinium. 6 cc's of IV Gadavist   was administered for the purposes of this examination. 1.5 cc was   discarded.    COMPARISON: Prior CT examination of the head and CT angiogram   examinations of thehead and neck from 8/17/2019.    FINDINGS: A small linear area of restricted diffusion is notable within   the right superior cerebellar hemisphere. There is associated T2/FLAIR   prolongation in this area, compatible with cytotoxic edema.    Multiple nonspecific T2/FLAIR hyperintense signal changes are noted   throughout the bihemispheric white matter without associated mass effect   or restricted diffusion. There is no abnormal brain parenchymal or   leptomeningeal enhancement.    Ventricular size and configuration is unremarkable. No abnormal extra   axial fluid collections are noted. Flow-voids are noted throughout the   major intracranial vessels, on the T2 weighted images, consistent with   their patency. There is a partially empty sella.    There is mucosal thickening and opacification of the paranasal sinuses in   a right-sided ostiomeatal unit pattern. The mastoid air cells are clear.   Calvarial signal appears unremarkable. The orbits appear within normal   limits.    IMPRESSION: Small linear acute/subacute infarction involving the right   superior cerebellar hemisphere with associated cytotoxic edema. No acute   intracranial hemorrhage or abnormal intracranial enhancement.    Multiple nonspecific abnormal white matter foci of T2/FLAIR prolongation   statistically favoring microvascular disease.    Paranasal sinus inflammatory disease in a right-sided ostiomeatal unit   pattern.                FLORA HERNÁNDEZ M.D., ATTENDING RADIOLOGIST  This document has been electronically signed. Aug 19 2019 12:36PM              < end of copied text >      Impression and Plan:    Patient presenting with slurred speech, now resolved   Small linear acute/subacute infarction involving the right   superior cerebellar hemisphere with associated cytotoxic edema  75% right ICA stenosis on CTA    - Duplex ordered for additional evaluation  - Currently no focal deficits secondary to right ICA disease   - will report findings to covering attending Dr. Baltazar   - continue with medical management, asa/statins  - No acute vascular surgical intervention at this time

## 2019-08-19 NOTE — OCCUPATIONAL THERAPY INITIAL EVALUATION ADULT - PERTINENT HX OF CURRENT PROBLEM, REHAB EVAL
Head CT reveals no acute intracranial hemorrhage or mass effect. CTA neck reveals severe calcified atherosclerotic plaque at the right carotid bulb and proximal right cervical ICA; approximately 75% stenosis of proximal  right cervical ICA by NASCET criteria; enlarged 1.6cm left level 2A lymph node, nonspecific; tree-in-bud opacities in right upper lung suggesting infection. CTA brain reveals no major vessel occlusion; no evidence of aneurysm; multifocal stenoses of intracranial arteries.

## 2019-08-19 NOTE — OCCUPATIONAL THERAPY INITIAL EVALUATION ADULT - LEVEL OF INDEPENDENCE: EATING, OT EVAL
supervision/pt finishing breakfast upon receiving pt; pt with active orders for supervision with meals and aspiration precautions

## 2019-08-19 NOTE — OCCUPATIONAL THERAPY INITIAL EVALUATION ADULT - PRECAUTIONS/LIMITATIONS, REHAB EVAL
fall precautions/aspiration precautions/seizure precautions/supervision with meals; head of bed 30 degrees

## 2019-08-19 NOTE — OCCUPATIONAL THERAPY INITIAL EVALUATION ADULT - ASSISTIVE DEVICE, REHAB EVAL
"  Pt needs appt scheduled-sent pt my chart message         Requested Prescriptions   Pending Prescriptions Disp Refills     SYNTHROID 175 MCG tablet [Pharmacy Med Name: SYNTHROID 175 MCG TABLET] 35 tablet 0     Sig: TAKE 1 TAB BY MOUTH FOR 6 DAYS PER WEEK AND 1.5 TABS FOR 1 DAY PER WEEK. OVERDUE FOR APPT    Thyroid Protocol Passed    5/19/2018  9:52 AM       Passed - Patient is 12 years or older       Passed - Recent (12 mo) or future (30 days) visit within the authorizing provider's specialty    Patient had office visit in the last 12 months or has a visit in the next 30 days with authorizing provider or within the authorizing provider's specialty.  See \"Patient Info\" tab in inbasket, or \"Choose Columns\" in Meds & Orders section of the refill encounter.           Passed - Normal TSH on file in past 12 months    Recent Labs   Lab Test  06/13/17   1028   TSH  3.65             Passed - No active pregnancy on record    If patient is pregnant or has had a positive pregnancy test, please check TSH.         Passed - No positive pregnancy test in past 12 months    If patient is pregnant or has had a positive pregnancy test, please check TSH.            "
Pt sched appt for 6/28  
bed rails

## 2019-08-19 NOTE — PROGRESS NOTE ADULT - ASSESSMENT
68y Female who is followed by neurology because of TIA.    TIA  Transient dysarthria, back to baseline.  MRI brain to r/o CVA  PT eval  Continue antiplatelet and statin.   Awaiting MRI and echocardiogram.    Lipids  LDL=58  Goal,  LDL is under 70  Continue Lipitor 40 mg  daily.    Discharge planning.   Return to group home once work up complete.

## 2019-08-19 NOTE — PROGRESS NOTE ADULT - ASSESSMENT
68 yr female Bipolar and schizophrenia, CAD, CABG, Hypertension , arthritis  presenting with sudden onset slurred speech, mental status changes that resolved and was found to be in AFIB     Problem/Plan - 1:  ·  Problem: Cerebrovascular accident (CVA) due to other mechanism.  Plan: Slurred speech is resolved  Brain CT showing no infarct or hemorrhage  MR brain w right cerebellar stroke  Carotid CTA showing 75% right carotid stenosis- called vascular to consult  CTA brain w multifocal stenosis intracranial arteries- no major vessel occlusion  Continue Lipitor 40 mg LDL at goal  Aspirin 81 mg daily   Neurology following  TTE pending     Problem/Plan - 2:  ·  Problem: Paroxysmal atrial fibrillation.  Plan: TTE pending  Cardiology following- will verify with them.  May be candidate for VAHID/cardioversion.      Problem/Plan - 3:  ·  Problem: Other schizophrenia.  Plan: Thiothixene 4mg po daily      Problem/Plan - 4:  ·  Problem: Bipolar 1 disorder.  Plan: stable.      Problem/Plan - 5:  ·  Problem: Acquired hypothyroidism.  Plan: levothyroxine 50mcg po daily.      Problem/Plan - 6:  Problem: Mixed hyperlipidemia. Plan: atorvastatin 40mg po daily.     Problem/Plan - 7:  ·  Problem: Essential hypertension.  Plan: Hold bp meds for SBP less than 180 for 48 hours.      Problem/Plan - 8:  ·  Problem: Pneumonia, bacterial.  Plan: Right lung opacity seen on CTA neck concerning for gram positive bacterial pneumonia is present on admission.    Leukocytosis  Dedicated CT chest obtained, as above, consistent w infectious bronchiolitis.  - need to obtain urine legionella antigen  - cont ceftriaxone and zithromax.    Dispo: Vascular to consent for carotid stenosis in setting of CVA. Cardiology input needed on AC for new onset a fib 68 yr female Bipolar and schizophrenia, CAD, CABG, Hypertension , arthritis  presenting with sudden onset slurred speech, mental status changes that resolved and was found to be in AFIB     Problem/Plan - 1:  ·  Problem: Cerebrovascular accident (CVA) due to other mechanism.  Plan: Slurred speech is resolved  Brain CT showing no infarct or hemorrhage  MR brain w right cerebellar stroke  Carotid CTA showing 75% right carotid stenosis- called vascular to consult  CTA brain w multifocal stenosis intracranial arteries- no major vessel occlusion  Continue Lipitor 40 mg LDL at goal  Aspirin 81 mg daily   Neurology following  TTE pending     Problem/Plan - 2:  ·  Problem: Paroxysmal atrial fibrillation.  Plan: TTE pending  Cardiology following- will verify with them.  May be candidate for VAHID/cardioversion.   Discussed w neurologist- cleared to start AC- start Eliquis     Problem/Plan - 3:  ·  Problem: Other schizophrenia.  Plan: Thiothixene 4mg po daily      Problem/Plan - 4:  ·  Problem: Bipolar 1 disorder.  Plan: stable.      Problem/Plan - 5:  ·  Problem: Acquired hypothyroidism.  Plan: levothyroxine 50mcg po daily.      Problem/Plan - 6:  Problem: Mixed hyperlipidemia. Plan: atorvastatin 40mg po daily.     Problem/Plan - 7:  ·  Problem: Essential hypertension.  Plan: Hold bp meds for SBP less than 180 for 48 hours.      Problem/Plan - 8:  ·  Problem: Pneumonia, bacterial.  Plan: Right lung opacity seen on CTA neck concerning for gram positive bacterial pneumonia is present on admission.    Leukocytosis  Dedicated CT chest obtained, as above, consistent w infectious bronchiolitis.  - need to obtain urine legionella antigen  - cont ceftriaxone and zithromax.    Dispo: Vascular to consent for carotid stenosis in setting of CVA.      Discussedw neurology Dr Mckeon, cleared to start AC. Discussed w Dr Dumont, recommends Eliquis 5 mg bid. Cardioversion planned prior to discharge. 68 yr female Bipolar and schizophrenia, CAD, CABG, Hypertension , arthritis  presenting with sudden onset slurred speech, mental status changes that resolved and was found to be in AFIB     Problem/Plan - 1:  ·  Problem: Cerebrovascular accident (CVA) due to other mechanism.  Plan: Slurred speech is resolved  Brain CT showing no infarct or hemorrhage  MR brain w right cerebellar stroke  Carotid CTA showing 75% right carotid stenosis- called vascular to consult  CTA brain w multifocal stenosis intracranial arteries- no major vessel occlusion  Continue Lipitor 40 mg LDL at goal  Aspirin 81 mg daily   Neurology following  TTE pending     Problem/Plan - 2:  ·  Problem: Paroxysmal atrial fibrillation. now in NSR    TTE pending  Cardiology following- will verify with them.  Discussed w neurologist- cleared to start AC- start Eliquis     Problem/Plan - 3:  ·  Problem: Other schizophrenia.  Plan: Thiothixene 4mg po daily      Problem/Plan - 4:  ·  Problem: Bipolar 1 disorder.  Plan: stable.      Problem/Plan - 5:  ·  Problem: Acquired hypothyroidism.  Plan: levothyroxine 50mcg po daily.      Problem/Plan - 6:  Problem: Mixed hyperlipidemia. Plan: atorvastatin 40mg po daily.     Problem/Plan - 7:  ·  Problem: Essential hypertension.  Plan: Hold bp meds for SBP less than 180 for 48 hours.      Problem/Plan - 8:  ·  Problem: Pneumonia, bacterial.  Plan: Right lung opacity seen on CTA neck concerning for gram positive bacterial pneumonia is present on admission.    Leukocytosis resolving   Dedicated CT chest obtained, as above, consistent w infectious bronchiolitis.  - need to obtain urine legionella antigen  - cont ceftriaxone and zithromax.    Dispo: Vascular to consent for carotid stenosis in setting of CVA.      Discussedw neurology Dr Mckeon, cleared to start AC. Discussed w Dr Dumont, recommends Eliquis 5 mg bid. Cardioversion if remains in atrial fib

## 2019-08-19 NOTE — PROGRESS NOTE ADULT - ATTENDING COMMENTS
pt is seen examined , normal  speech .d/w neurologist and KENROY Hamlin in am   pt is stable   exam as above

## 2019-08-19 NOTE — OCCUPATIONAL THERAPY INITIAL EVALUATION ADULT - ADDITIONAL COMMENTS
Pt resides in [facility] for last 15 years, prior to that was in Group Home for 14 years. Pt requires assistance for all ADLs. Pt reports she was always ambulating with RW and assistance however has not walked recently; pt has been transferring from bed to wheelchair with assistance and RW. Pt is left handed.

## 2019-08-19 NOTE — CONSULT NOTE ADULT - SUBJECTIVE AND OBJECTIVE BOX
68yF was admitted on 08-17      Patient is a 68y old  Female who presents with a chief complaint of Slurred speech (19 Aug 2019 09:55)    HPI:  68 yr female with Bipolar and schizophrenia , resident of  nursing home  presented with sudden onset slurring of speech started 7 am in the morning of admission and resolved by the time she came to the ED.  Patient is legally blind, has debilitating arthritis that has confined her to a wheelchair, CAD with CABG 2008, Hypertension, a hysterectomy for uterine cancer.  In ED was found to be in Afib- new onset , Leukocytosis 11.8. CTAngio showing tree in bud opacity of right upper lung concerning for infection or pneumonia.  Head CT show no acute CVA or hemorrhage.  CTA head showing multifocal stenosis intracranial arteries. 75% right carotid bulb stenosis. (17 Aug 2019 16:43)  Patient's dysarthria has resolved, and she is feeling better. She is still pending MRI and echocardiogram, but her neurologic symptoms have improved. She states that she is feeling back to her baseline.    Imaging showed (reviewed):  HEAD CT - 8/17 - No acute intracranial hemorrhage or mass effect.   CT ANGIO NECK - 8/17 - Severe calcified atherosclerotic plaque at the right carotid bulb and proximal right cervical ICA. Approximately 75% stenosis of the proximal right cervical ICA by NASCET criteria. Enlarged 1.6 cm left level 2A lymph node, nonspecific. Tree-in-bud opacities in the right upper lung suggesting infection.  CT ANGIO BRAIN - 8/17- No major vessel occlusion. No evidence of aneurysm. Multifocal stenoses of intracranial arteries    REVIEW OF SYSTEMS  Constitutional - No fever, No weight loss,+ fatigue  HEENT - No eye pain, + visual disturbances, + difficulty hearing, No tinnitus, No vertigo, No neck pain  Respiratory - No cough, No wheezing, No shortness of breath  Cardiovascular - No chest pain, No palpitations  Gastrointestinal - No abdominal pain, No nausea, No vomiting, No diarrhea, No constipation  Genitourinary - No dysuria, No frequency, No hematuria, No incontinence  Neurological - No headaches, No memory loss, + loss of strength, No numbness, No tremors  Skin - No itching, No rashes, No lesions   Endocrine - No temperature intolerance  Musculoskeletal - No joint pain, No joint swelling, No muscle pain  Psychiatric - No depression, No anxiety    VITALS  T(C): 37.2 (08-19-19 @ 08:52), Max: 37.2 (08-19-19 @ 08:52)  HR: 85 (08-19-19 @ 08:00) (66 - 90)  BP: 119/59 (08-19-19 @ 08:00) (102/50 - 134/73)  RR: 16 (08-19-19 @ 08:00) (15 - 21)  SpO2: 98% (08-19-19 @ 08:00) (97% - 100%)  Wt(kg): --    PAST MEDICAL & SURGICAL HISTORY  Intrinsic eczema  Muscle weakness (generalized)  Hyperlipidemia  Hypokalemia  Hypothyroid  Schizophrenia  Bipolar 1 disorder  History of hip replacement  S/P CABG x 3      SOCIAL HISTORY  Smoking - Denied  EtOH - Denied   Drugs - Denied    FUNCTIONAL HISTORY  Lives at [facility]. She usually ambulates with a wheelchair, but she is able to transfer from the bed to her wheelchair with walker, and states that she is able to walk with walker. There are no stairs that she needs to climb up. She has assistance as needed at [facility].     CURRENT FUNCTIONAL STATUS  8/18/19  Bed Mobility: Sit to Supine:     · Level of [place]	moderate assist (50% patients effort)	  · Physical Assist/Nonphysical Assist	1 person assist	    Bed Mobility: Supine to Sit:     · Level of [place]	moderate assist (50% patients effort)	  · Physical Assist/Nonphysical Assist	1 person assist	    Transfer: Sit to Stand:     · Level of [place]	minimum assist (75% patients effort)	  · Physical Assist/Nonphysical Assist	1 person assist	  · Weight-Bearing Restrictions	full weight-bearing	  · Assistive Device	rolling walker	    Transfer: Stand to Sit:     · Level of [place]	minimum assist (75% patients effort)	  · Physical Assist/Nonphysical Assist	1 person assist	    Gait Skills:     · Level of [place]	contact guard	  · Physical Assist/Nonphysical Assist	1 person assist	  · Weight-Bearing Restrictions	full weight-bearing	  · Assistive Device	rolling walker	  · Gait Distance	5 feet	    Gait Analysis:     · Gait Deviations Noted	decreased step length; decreased stride length	  · Impairments Contributing to Gait Deviations	impaired balance; decreased flexibility	    Stair Negotiation:     · Level of [place]	N/A	      FAMILY HISTORY   No pertinent family history in first degree relatives      RECENT LABS/IMAGING  CBC Full  -  ( 19 Aug 2019 05:52 )  WBC Count : 7.93 K/uL  RBC Count : 4.40 M/uL  Hemoglobin : 11.7 g/dL  Hematocrit : 37.6 %  Platelet Count - Automated : 250 K/uL  Mean Cell Volume : 85.5 fl  Mean Cell Hemoglobin : 26.6 pg  Mean Cell Hemoglobin Concentration : 31.1 gm/dL  Auto Neutrophil # : x  Auto Lymphocyte # : x  Auto Monocyte # : x  Auto Eosinophil # : x  Auto Basophil # : x  Auto Neutrophil % : x  Auto Lymphocyte % : x  Auto Monocyte % : x  Auto Eosinophil % : x  Auto Basophil % : x    08-19    140  |  104  |  20.0  ----------------------------<  88  4.2   |  26.0  |  0.60    Ca    9.0      19 Aug 2019 05:52          ALLERGIES  bacitracin (Unknown)  neomycin (Unknown)  Neosporin (Unknown)  polymyxin B ophthalmic (Unknown)  tobramycin (Unknown)      MEDICATIONS   aspirin Suppository 300 milliGRAM(s) Rectal daily  atorvastatin 40 milliGRAM(s) Oral at bedtime  azithromycin  IVPB 500 milliGRAM(s) IV Intermittent every 24 hours  cefTRIAXone   IVPB 1000 milliGRAM(s) IV Intermittent every 24 hours  latanoprost 0.005% Ophthalmic Solution 1 Drop(s) Both EYES at bedtime  levothyroxine 50 MICROGram(s) Oral daily  thiothixene 4 milliGRAM(s) Oral <User Schedule>      ----------------------------------------------------------------------------------------  PHYSICAL EXAM  Constitutional - NAD, Comfortably lying in bed  HEENT - NCAT, EOMI  Neck - Supple, No limited ROM   Extremities - No C/C/E, No calf tenderness   Neurologic Exam -                    Cognitive - Awake, Alert, AAO to self, place, date, year, situation     Communication - Fluent, No dysarthria     Cranial Nerves - CN 2-12 intact     Motor -                     LEFT    UE - ShAB 3/5, EF 4/5, EE 4/5, WE 4/5,  4/5                    RIGHT UE - ShAB 3/5, EF 4/5, EE 4/5, WE 4/5,  4/5                    LEFT    LE - HF 3/5, KE 4/5, DF 4/5, PF 4/5                    RIGHT LE - HF 3/5, KE 4/5, DF 4/5, PF 4/5        Sensory - Intact to LT     Reflexes - DTR Intact     Coordination - FTN intact  Psychiatric - Mood stable, Affect WNL 68yF was admitted on 08-17      Patient is a 68y old  Female who presents with a chief complaint of Slurred speech (19 Aug 2019 09:55)    HPI:  68 yr female with Bipolar and schizophrenia , resident of  nursing home  presented with sudden onset slurring of speech started 7 am in the morning of admission and resolved by the time she came to the ED.  Patient is legally blind, has debilitating arthritis that has confined her to a wheelchair, CAD with CABG 2008, Hypertension, a hysterectomy for uterine cancer.  In ED was found to be in Afib- new onset , Leukocytosis 11.8. CTAngio showing tree in bud opacity of right upper lung concerning for infection or pneumonia.  Head CT show no acute CVA or hemorrhage.  CTA head showing multifocal stenosis intracranial arteries. 75% right carotid bulb stenosis. (17 Aug 2019 16:43)  Patient's dysarthria has resolved, and she is feeling better. She is still pending MRI and echocardiogram, but her neurologic symptoms have improved. She states that she is feeling back to her baseline.    Imaging showed (reviewed):  HEAD CT - 8/17 - No acute intracranial hemorrhage or mass effect.   CT ANGIO NECK - 8/17 - Severe calcified atherosclerotic plaque at the right carotid bulb and proximal right cervical ICA. Approximately 75% stenosis of the proximal right cervical ICA by NASCET criteria. Enlarged 1.6 cm left level 2A lymph node, nonspecific. Tree-in-bud opacities in the right upper lung suggesting infection.  CT ANGIO BRAIN - 8/17- No major vessel occlusion. No evidence of aneurysm. Multifocal stenoses of intracranial arteries    REVIEW OF SYSTEMS  Constitutional - No fever, No weight loss,+ fatigue  HEENT - No eye pain, + visual disturbances, + difficulty hearing, No tinnitus, No vertigo, No neck pain  Respiratory - No cough, No wheezing, No shortness of breath  Cardiovascular - No chest pain, No palpitations  Gastrointestinal - No abdominal pain, No nausea, No vomiting, No diarrhea, No constipation  Genitourinary - No dysuria, No frequency, No hematuria, No incontinence  Neurological - No headaches, No memory loss, + loss of strength, No numbness, No tremors  Skin - No itching, No rashes, No lesions   Endocrine - No temperature intolerance  Musculoskeletal - No joint pain, No joint swelling, No muscle pain  Psychiatric - No depression, No anxiety    VITALS  T(C): 37.2 (08-19-19 @ 08:52), Max: 37.2 (08-19-19 @ 08:52)  HR: 85 (08-19-19 @ 08:00) (66 - 90)  BP: 119/59 (08-19-19 @ 08:00) (102/50 - 134/73)  RR: 16 (08-19-19 @ 08:00) (15 - 21)  SpO2: 98% (08-19-19 @ 08:00) (97% - 100%)  Wt(kg): --    PAST MEDICAL & SURGICAL HISTORY  Intrinsic eczema  Muscle weakness (generalized)  Hyperlipidemia  Hypokalemia  Hypothyroid  Schizophrenia  Bipolar 1 disorder  History of hip replacement  S/P CABG x 3      SOCIAL HISTORY  Smoking - Denied  EtOH - Denied   Drugs - Denied    FUNCTIONAL HISTORY  Lives at [facility]. She usually ambulates with a wheelchair, but she is able to transfer from the bed to her wheelchair with walker, and states that she is able to walk with walker. There are no stairs that she needs to climb up. She has assistance as needed at [facility].     CURRENT FUNCTIONAL STATUS  8/18/19  Bed Mobility: Sit to Supine:     · Level of [place]	moderate assist (50% patients effort)	  · Physical Assist/Nonphysical Assist	1 person assist	    Bed Mobility: Supine to Sit:     · Level of [place]	moderate assist (50% patients effort)	  · Physical Assist/Nonphysical Assist	1 person assist	    Transfer: Sit to Stand:     · Level of [place]	minimum assist (75% patients effort)	  · Physical Assist/Nonphysical Assist	1 person assist	  · Weight-Bearing Restrictions	full weight-bearing	  · Assistive Device	rolling walker	    Transfer: Stand to Sit:     · Level of [place]	minimum assist (75% patients effort)	  · Physical Assist/Nonphysical Assist	1 person assist	    Gait Skills:     · Level of [place]	contact guard	  · Physical Assist/Nonphysical Assist	1 person assist	  · Weight-Bearing Restrictions	full weight-bearing	  · Assistive Device	rolling walker	  · Gait Distance	5 feet	    Gait Analysis:     · Gait Deviations Noted	decreased step length; decreased stride length	  · Impairments Contributing to Gait Deviations	impaired balance; decreased flexibility	    Stair Negotiation:     · Level of [place]	N/A	      FAMILY HISTORY   No pertinent family history in first degree relatives      RECENT LABS/IMAGING  CBC Full  -  ( 19 Aug 2019 05:52 )  WBC Count : 7.93 K/uL  RBC Count : 4.40 M/uL  Hemoglobin : 11.7 g/dL  Hematocrit : 37.6 %  Platelet Count - Automated : 250 K/uL  Mean Cell Volume : 85.5 fl  Mean Cell Hemoglobin : 26.6 pg  Mean Cell Hemoglobin Concentration : 31.1 gm/dL  Auto Neutrophil # : x  Auto Lymphocyte # : x  Auto Monocyte # : x  Auto Eosinophil # : x  Auto Basophil # : x  Auto Neutrophil % : x  Auto Lymphocyte % : x  Auto Monocyte % : x  Auto Eosinophil % : x  Auto Basophil % : x    08-19    140  |  104  |  20.0  ----------------------------<  88  4.2   |  26.0  |  0.60    Ca    9.0      19 Aug 2019 05:52          ALLERGIES  bacitracin (Unknown)  neomycin (Unknown)  Neosporin (Unknown)  polymyxin B ophthalmic (Unknown)  tobramycin (Unknown)      MEDICATIONS   aspirin Suppository 300 milliGRAM(s) Rectal daily  atorvastatin 40 milliGRAM(s) Oral at bedtime  azithromycin  IVPB 500 milliGRAM(s) IV Intermittent every 24 hours  cefTRIAXone   IVPB 1000 milliGRAM(s) IV Intermittent every 24 hours  latanoprost 0.005% Ophthalmic Solution 1 Drop(s) Both EYES at bedtime  levothyroxine 50 MICROGram(s) Oral daily  thiothixene 4 milliGRAM(s) Oral <User Schedule>      ----------------------------------------------------------------------------------------  PHYSICAL EXAM  Constitutional - NAD, Comfortably lying in bed  HEENT - NCAT, EOMI  Neck - Supple, No limited ROM   Extremities - No C/C/E, No calf tenderness   Neurologic Exam -                    Cognitive - Awake, Alert, AAO to self, place, date, year, situation     Communication - Fluent, No dysarthria     Cranial Nerves - CN 2-12 intact     Motor -                     LEFT    UE - ShAB 3/5, EF 4/5, EE 4/5, WE 4/5,  4/5                    RIGHT UE - ShAB 3/5, EF 4/5, EE 4/5, WE 4/5,  4/5                    LEFT    LE - HF 3/5, KE 4/5, DF 4/5, PF 4/5                    RIGHT LE - HF 3/5, KE 4/5, DF 4/5, PF 4/5        Sensory - Intact to LT     Reflexes - DTR Intact     Coordination - FTN intact  Psychiatric - Mood stable, Affect WNL      ----------------------------------------------------------------------------------------  ASSESSMENT/PLAN  68y Female with functional deficits after TIA causing slurred speech.    TIA - CTH negative, MRI showed a small linear acute/subacute infarction involving the right superior cerebellar hemisphere with associated cytotoxic edema, patient is back at baseline functional status, Aspirin, Lipitor  Pneumonia - Ceftriaxone, Azithromycin  Hypothyroidism - Synthroid  Schizophrenia - Navane  DVT PPX - SCDs    Rehab - Patient with CVA causing slurred speech with MRI showing a small acute/subacute infarct. Patient's speech has returned to baseline and she is feeling better from admission. Patient resides at [facility] and receives assistance as necessary. Patient is working with physical therapy and seems to be at her baseline functional status.  Recommend return to [facility] as her speech deficits have resolved and she is at her baseline functional status.  Will continue to follow for ongoing rehab needs and recommendations as they may change based on her functional status and her evaluations and exams.  Continue bedside therapy as well as OOB throughout the day with mobilization throughout the day with staff to maintain cardiopulmonary function and prevention of secondary complications related to debility.

## 2019-08-20 ENCOUNTER — TRANSCRIPTION ENCOUNTER (OUTPATIENT)
Age: 69
End: 2019-08-20

## 2019-08-20 PROCEDURE — 99232 SBSQ HOSP IP/OBS MODERATE 35: CPT

## 2019-08-20 PROCEDURE — 93016 CV STRESS TEST SUPVJ ONLY: CPT

## 2019-08-20 PROCEDURE — 99233 SBSQ HOSP IP/OBS HIGH 50: CPT

## 2019-08-20 PROCEDURE — 93018 CV STRESS TEST I&R ONLY: CPT

## 2019-08-20 PROCEDURE — 78452 HT MUSCLE IMAGE SPECT MULT: CPT | Mod: 26

## 2019-08-20 RX ORDER — OMEPRAZOLE 10 MG/1
1 CAPSULE, DELAYED RELEASE ORAL
Qty: 0 | Refills: 0 | DISCHARGE

## 2019-08-20 RX ORDER — OLOPATADINE HYDROCHLORIDE 1 MG/ML
1 SOLUTION/ DROPS OPHTHALMIC
Qty: 0 | Refills: 0 | DISCHARGE

## 2019-08-20 RX ORDER — METOPROLOL TARTRATE 50 MG
50 TABLET ORAL
Refills: 0 | Status: DISCONTINUED | OUTPATIENT
Start: 2019-08-20 | End: 2019-08-21

## 2019-08-20 RX ORDER — ASPIRIN/CALCIUM CARB/MAGNESIUM 324 MG
1 TABLET ORAL
Qty: 0 | Refills: 0 | DISCHARGE

## 2019-08-20 RX ORDER — OMEGA-3 ACID ETHYL ESTERS 1 G
1 CAPSULE ORAL
Qty: 0 | Refills: 0 | DISCHARGE

## 2019-08-20 RX ORDER — ATORVASTATIN CALCIUM 80 MG/1
1 TABLET, FILM COATED ORAL
Qty: 0 | Refills: 0 | DISCHARGE
Start: 2019-08-20

## 2019-08-20 RX ORDER — ASPIRIN/CALCIUM CARB/MAGNESIUM 324 MG
81 TABLET ORAL DAILY
Refills: 0 | Status: DISCONTINUED | OUTPATIENT
Start: 2019-08-20 | End: 2019-08-21

## 2019-08-20 RX ORDER — LORATADINE 10 MG/1
1 TABLET ORAL
Qty: 0 | Refills: 0 | DISCHARGE

## 2019-08-20 RX ORDER — TRAVOPROST 0.04 MG/ML
1 SOLUTION/ DROPS OPHTHALMIC
Qty: 0 | Refills: 0 | DISCHARGE

## 2019-08-20 RX ORDER — BRINZOLAMIDE 10 MG/ML
1 SUSPENSION/ DROPS OPHTHALMIC
Qty: 0 | Refills: 0 | DISCHARGE

## 2019-08-20 RX ORDER — THIOTHIXENE 5 MG
4 CAPSULE ORAL
Qty: 0 | Refills: 0 | DISCHARGE

## 2019-08-20 RX ORDER — BRIMONIDINE TARTRATE, TIMOLOL MALEATE 2; 5 MG/ML; MG/ML
1 SOLUTION/ DROPS OPHTHALMIC
Qty: 0 | Refills: 0 | DISCHARGE

## 2019-08-20 RX ORDER — APIXABAN 2.5 MG/1
1 TABLET, FILM COATED ORAL
Qty: 0 | Refills: 0 | DISCHARGE
Start: 2019-08-20

## 2019-08-20 RX ORDER — LEVOTHYROXINE SODIUM 125 MCG
1 TABLET ORAL
Qty: 0 | Refills: 0 | DISCHARGE

## 2019-08-20 RX ORDER — TACROLIMUS/VEHICLE BASE NO.238 0.1 %
1 CREAM (GRAM) TOPICAL
Qty: 0 | Refills: 0 | DISCHARGE

## 2019-08-20 RX ADMIN — APIXABAN 5 MILLIGRAM(S): 2.5 TABLET, FILM COATED ORAL at 17:57

## 2019-08-20 RX ADMIN — Medication 50 MICROGRAM(S): at 06:31

## 2019-08-20 RX ADMIN — CEFTRIAXONE 100 MILLIGRAM(S): 500 INJECTION, POWDER, FOR SOLUTION INTRAMUSCULAR; INTRAVENOUS at 01:18

## 2019-08-20 RX ADMIN — CEFTRIAXONE 100 MILLIGRAM(S): 500 INJECTION, POWDER, FOR SOLUTION INTRAMUSCULAR; INTRAVENOUS at 20:55

## 2019-08-20 RX ADMIN — LATANOPROST 1 DROP(S): 0.05 SOLUTION/ DROPS OPHTHALMIC; TOPICAL at 22:38

## 2019-08-20 RX ADMIN — Medication 50 MILLIGRAM(S): at 17:57

## 2019-08-20 RX ADMIN — APIXABAN 5 MILLIGRAM(S): 2.5 TABLET, FILM COATED ORAL at 06:31

## 2019-08-20 RX ADMIN — Medication 4 MILLIGRAM(S): at 17:57

## 2019-08-20 RX ADMIN — ATORVASTATIN CALCIUM 40 MILLIGRAM(S): 80 TABLET, FILM COATED ORAL at 21:56

## 2019-08-20 RX ADMIN — AZITHROMYCIN 255 MILLIGRAM(S): 500 TABLET, FILM COATED ORAL at 21:56

## 2019-08-20 RX ADMIN — AZITHROMYCIN 255 MILLIGRAM(S): 500 TABLET, FILM COATED ORAL at 01:49

## 2019-08-20 RX ADMIN — Medication 81 MILLIGRAM(S): at 12:22

## 2019-08-20 RX ADMIN — ATORVASTATIN CALCIUM 40 MILLIGRAM(S): 80 TABLET, FILM COATED ORAL at 01:19

## 2019-08-20 NOTE — PROGRESS NOTE ADULT - ASSESSMENT
68 yr female Bipolar and schizophrenia, CAD, CABG, Hypertension , arthritis  presenting with sudden onset slurred speech, mental status changes that resolved and was found to be in AFIB, seen by neurology and cardiology , she is converted to NSR , started on eliquis , MR positive for stroke , TTE with wall motion abnormalities , stress test ordered , pt declined cardiac cath r/o ischemic heart disaesae     1- Abnormal wall motion abn / atrial fib   now in NSR   ischemic work up per cardio , pharmacologic stress test ordered   cont aspirin, statin   2- Cerebrovascular accident (CVA) due to other mechanism.  MR brain w right cerebellar stroke  cont aspirin     3-Carotid areteryu stenosis on the right   vascular input appreciated   CTA brain w multifocal stenosis intracranial arteries- no major vessel occlusion  Continue Lipitor 40 mg LDL at goal  Aspirin 81 mg daily     4- Paroxysmal atrial fibrillation  now in NSR   cont anticoagulation   Cardiology on board    5- Other schizophrenia.  Plan: Thiothixene 4mg po daily      6-Acquired hypothyroidism.  Plan: levothyroxine 50mcg po daily.     7-Mixed hyperlipidemia. Plan: atorvastatin 40mg po daily.    8-Essential hypertension. resume home meds      9- Pneumonia, bacterial.  Plan: Right lung opacity seen on CTA neck concerning for gram positive bacterial pneumonia is present on admission.    Leukocytosis resolving   - cont ceftriaxone and zithromax.    Disposition home vs ALEX pending stress test 68 yr female Bipolar and schizophrenia, CAD, CABG, Hypertension , arthritis  presenting with sudden onset slurred speech, mental status changes that resolved and was found to be in AFIB, seen by neurology and cardiology , she is converted to NSR , started on eliquis , MR positive for stroke , TTE with wall motion abnormalities , stress test ordered , pt declined cardiac cath r/o ischemic heart disaesae     1- Abnormal wall motion abn   ischemic work up per cardio , pharmacologic stress test ordered   cont aspirin, statin   2- Cerebrovascular accident (CVA) due to other mechanism.  MR brain w right cerebellar stroke  cont aspirin     3-Carotid aretery  stenosis on the right   vascular input appreciated   CTA brain w multifocal stenosis intracranial arteries- no major vessel occlusion  Continue Lipitor 40 mg LDL at goal  Aspirin 81 mg daily     4- Paroxysmal atrial fibrillation  cont anticoagulation and b blocker   Cardiology on board      5- Other schizophrenia.  Plan: Thiothixene 4mg po daily      6-Acquired hypothyroidism.  Plan: levothyroxine 50mcg po daily.     7-Mixed hyperlipidemia. Plan: atorvastatin 40mg po daily.    8-Essential hypertension. resume home meds      9- Pneumonia, bacterial.  Plan: Right lung opacity seen on CTA neck concerning for gram positive bacterial pneumonia is present on admission.    Leukocytosis resolving   - cont ceftriaxone and zithromax.    Disposition home vs ALEX pending stress test

## 2019-08-20 NOTE — DISCHARGE NOTE PROVIDER - HOSPITAL COURSE
68 yr female Bipolar and schizophrenia, CAD, CABG, Hypertension , arthritis  presenting with sudden onset slurred speech, mental status changes that resolved and was found to be in AFIB, seen by neurology and cardiology , started on eliquis , MR positive for stroke , TTE with wall motion abnormalities , stress test performed , pt declined cardiac cath and  per Cardiology will be managed medically. Patient on ASA, statin, BB. MR brain w right cerebellar stroke. followed by Neurology, continue ASA/statin. Carotid artery stenosis on the right not hemodynamically significant as per vascular. CTA brain w multifocal stenosis intracranial arteries- no major vessel occlusion. Right lung opacity seen on CTA neck concerning for gram positive bacterial pneumonia is present on admission received IV ceftriaxone and zithromax. VAHID/CV performed on 8/21/19. 68 yr female Bipolar and schizophrenia, CAD, CABG, Hypertension , arthritis  presenting with sudden onset slurred speech, mental status changes that resolved and was found to be in AFIB, seen by neurology and cardiology , started on eliquis , MR positive for stroke , TTE with wall motion abnormalities , stress test performed , pt declined cardiac cath and  per Cardiology will be managed medically. Patient on ASA, statin, BB. MR brain w right cerebellar stroke. followed by Neurology, continue ASA/statin. Carotid artery stenosis on the right not hemodynamically significant as per vascular. CTA brain w multifocal stenosis intracranial arteries- no major vessel occlusion. Right lung opacity seen on CTA neck concerning for gram positive bacterial pneumonia is present on admission received IV ceftriaxone and zithromax, complete course of antibiotics with oral Vantin.  VAHID/CV to be arranged as outpatient. 68 yr female Bipolar and schizophrenia, CAD, CABG, Hypertension , arthritis  presenting with sudden onset slurred speech, mental status changes that resolved and was found to be in AFIB, seen by neurology and cardiology , started on eliquis , MR positive for stroke , TTE with wall motion abnormalities , stress test performed , pt declined cardiac cath and  per Cardiology will be managed medically. Patient on ASA, statin, BB. MR brain w right cerebellar stroke. followed by Neurology, continue ASA/statin. Carotid artery stenosis on the right not hemodynamically significant as per vascular. CTA brain w multifocal stenosis intracranial arteries- no major vessel occlusion. Right lung opacity seen on CTA neck concerning for gram positive bacterial pneumonia is present on admission received IV ceftriaxone and zithromax, complete course of antibiotics with oral Vantin.  VAHID/CV to be arranged as outpatient.     d/w the sister all finding , plan  and after discharge follow up , [facility] re DNR/DNI she will d/w her sister and her brother , there is DNR form in the chart from [facility]     total time spend coordinating care is 35 min

## 2019-08-20 NOTE — DISCHARGE NOTE PROVIDER - CARE PROVIDERS DIRECT ADDRESSES
,DirectAddress_Unknown,DirectAddress_Unknown,DirectAddress_Unknown,[email or web address]

## 2019-08-20 NOTE — DISCHARGE NOTE PROVIDER - NSDCCPCAREPLAN_GEN_ALL_CORE_FT
PRINCIPAL DISCHARGE DIAGNOSIS  Diagnosis: Cerebrovascular accident (CVA)  Assessment and Plan of Treatment:       SECONDARY DISCHARGE DIAGNOSES  Diagnosis: Atrial fibrillation  Assessment and Plan of Treatment: PRINCIPAL DISCHARGE DIAGNOSIS  Diagnosis: Cerebrovascular accident (CVA)  Assessment and Plan of Treatment: Continue aspirin/statin  follow up with Neurology 1-2 weeks  sooner if needed  Follow up with Vascular surgery for further management of carotid stenosis      SECONDARY DISCHARGE DIAGNOSES  Diagnosis: Atrial fibrillation  Assessment and Plan of Treatment: continue Metoprolol/Eliquis  follow up with Cardiology 1-2 weeks, sooner if needed  Cardiology to arrange outpatient VAHID/CV

## 2019-08-20 NOTE — PROGRESS NOTE ADULT - ASSESSMENT
68 yr female with Bipolar and schizophrenia , resident of  nursing home  presented with sudden onset slurring of speech started 7 am in the morning of admission and resolved by the time she came to the ED.  Patient is legally blind , has debilitating arthritis that  has confined her to a wheelchair, CAD with CABG 2008, Hypertension, a hysterectomy for uterine cancer.  In ED was found to be in Afib- new onset , Leukocytosis 11.8. CT Angio showing tree in bud opacity of right upper lung concerning for infection or pneumonia.  Head CT show no acute CVA or hemorrhage.  CTA head showing multifocal stenosis intracranial arteries. 75% right carotid bulb stenosis.     TTE performed revealing WMAs suggestive of CAD. Per primary team, neurology cleared for OAC in light of above MRI results.    AF  - new onset but well rate-controlled  - cleared for OAC- started on Eliquis 5 mg BID  - would plan for VAHID/DCCV prior to discharge    CAD?  - TTE w/preserved EF, mod pulm HTN, and WMAs suggestive of CAD  - keep NPO for Lexiscan today (if not, tomorrow) as pt refusing cardiac cath    HTN  - would resume antihypertensives per neurology recommendations    HLD  - continue statin

## 2019-08-20 NOTE — DISCHARGE NOTE PROVIDER - CARE PROVIDER_API CALL
Immanuel Dumont)  Cardiovascular Disease; Internal Medicine  [address] [place] [address]  Phone: [phone]  Fax: [phone]  Follow Up Time:     Arnol Waterman)  Family Medicine  [address] [place] [address]  Phone: [phone]  Fax: [phone]  Follow Up Time:     Avelino Baltazar)  Surgery  [address], [address] [place] [address]  Phone: [phone]  Fax: [phone]  Follow Up Time:     Vickey Mckeon)  Neurology; Vascular Neurology  [address], [address]  [place] [address]  Phone: [phone]  Fax: [phone]  Follow Up Time:

## 2019-08-20 NOTE — DISCHARGE NOTE PROVIDER - PROVIDER TOKENS
PROVIDER:[TOKEN:[[id number]]],PROVIDER:[TOKEN:[[id number]]],PROVIDER:[TOKEN:[[id number]]],PROVIDER:[TOKEN:[[id number]]]

## 2019-08-21 ENCOUNTER — TRANSCRIPTION ENCOUNTER (OUTPATIENT)
Age: 69
End: 2019-08-21

## 2019-08-21 VITALS
SYSTOLIC BLOOD PRESSURE: 123 MMHG | HEART RATE: 68 BPM | TEMPERATURE: 98 F | RESPIRATION RATE: 18 BRPM | DIASTOLIC BLOOD PRESSURE: 64 MMHG | OXYGEN SATURATION: 96 %

## 2019-08-21 PROCEDURE — 93010 ELECTROCARDIOGRAM REPORT: CPT

## 2019-08-21 PROCEDURE — 99239 HOSP IP/OBS DSCHRG MGMT >30: CPT

## 2019-08-21 PROCEDURE — 99232 SBSQ HOSP IP/OBS MODERATE 35: CPT

## 2019-08-21 RX ORDER — AMLODIPINE BESYLATE 2.5 MG/1
1 TABLET ORAL
Qty: 0 | Refills: 0 | DISCHARGE

## 2019-08-21 RX ORDER — METOPROLOL TARTRATE 50 MG
25 TABLET ORAL
Refills: 0 | Status: DISCONTINUED | OUTPATIENT
Start: 2019-08-21 | End: 2019-08-21

## 2019-08-21 RX ORDER — FUROSEMIDE 40 MG
1 TABLET ORAL
Qty: 0 | Refills: 0 | DISCHARGE

## 2019-08-21 RX ORDER — ACETAMINOPHEN 500 MG
0 TABLET ORAL
Qty: 0 | Refills: 0 | DISCHARGE

## 2019-08-21 RX ORDER — IBUPROFEN 200 MG
0 TABLET ORAL
Qty: 0 | Refills: 0 | DISCHARGE

## 2019-08-21 RX ORDER — METOPROLOL TARTRATE 50 MG
1 TABLET ORAL
Qty: 0 | Refills: 0 | DISCHARGE

## 2019-08-21 RX ORDER — SIMVASTATIN 20 MG/1
1 TABLET, FILM COATED ORAL
Qty: 0 | Refills: 0 | DISCHARGE

## 2019-08-21 RX ORDER — CEFPODOXIME PROXETIL 100 MG
1 TABLET ORAL
Qty: 4 | Refills: 0
Start: 2019-08-21 | End: 2019-08-22

## 2019-08-21 RX ORDER — CEFPODOXIME PROXETIL 100 MG
200 TABLET ORAL EVERY 12 HOURS
Refills: 0 | Status: DISCONTINUED | OUTPATIENT
Start: 2019-08-21 | End: 2019-08-21

## 2019-08-21 RX ORDER — MAGNESIUM HYDROXIDE 400 MG/1
0 TABLET, CHEWABLE ORAL
Qty: 0 | Refills: 0 | DISCHARGE

## 2019-08-21 RX ORDER — METOPROLOL TARTRATE 50 MG
1 TABLET ORAL
Qty: 0 | Refills: 0 | DISCHARGE
Start: 2019-08-21

## 2019-08-21 RX ADMIN — Medication 50 MICROGRAM(S): at 06:19

## 2019-08-21 RX ADMIN — APIXABAN 5 MILLIGRAM(S): 2.5 TABLET, FILM COATED ORAL at 06:20

## 2019-08-21 NOTE — PROGRESS NOTE ADULT - ASSESSMENT
68 yr female with Bipolar and schizophrenia , resident of  nursing home  presented with sudden onset slurring of speech started 7 am in the morning of admission and resolved by the time she came to the ED.  Patient is legally blind , has debilitating arthritis that  has confined her to a wheelchair, CAD with CABG 2008, Hypertension, a hysterectomy for uterine cancer.  In ED was found to be in Afib- new onset , Leukocytosis 11.8. CT Angio showing tree in bud opacity of right upper lung concerning for infection or pneumonia.  Head CT show no acute CVA or hemorrhage.  CTA head showing multifocal stenosis intracranial arteries. 75% right carotid bulb stenosis.       PLAN:  CAD  - NST + for inferior wall ischemia  - pt afraid of cath and does not want it at this time  - will manage medically with ASA/statin/beta blockade  - f/u as outpt with primary cardiology    AF  - new onset but well rate-controlled  - cont Eliquis 5 mg BID for CHADS-VASC = 4  - cont beta blockers (hold if <45 bpm)  - can arrange for VAHID/DCCV electively as outpatient    HTN  - would resume antihypertensives per neurology recommendations    HLD  - continue statin

## 2019-08-21 NOTE — PROGRESS NOTE ADULT - REASON FOR ADMISSION
Slurred speech

## 2019-08-21 NOTE — DISCHARGE NOTE NURSING/CASE MANAGEMENT/SOCIAL WORK - NSDCDPATPORTLINK_GEN_ALL_CORE
You can access the [email or web address] Patient Portal, offered by [facility], by registering with the following website: [email or web address]

## 2019-08-21 NOTE — DISCHARGE NOTE NURSING/CASE MANAGEMENT/SOCIAL WORK - NSDCPEPTSTRK_GEN_ALL_CORE
Stroke education booklet/Need for follow up after discharge/Risk factors for stroke/Stroke warning signs and symptoms/Signs and symptoms of stroke/Stroke support groups for patients, families, and friends/Call 911 for stroke/Prescribed medications

## 2019-08-21 NOTE — PROGRESS NOTE ADULT - SUBJECTIVE AND OBJECTIVE BOX
CC: Sudden slurred speech is resolved.  Possible TIA in the setting of behavioral health disorder bipolar and schizophrenia . Now back to baseline mental and functional status.   HPI:  68 yr female with Bipolar and schizophrenia , resident of  nursing home  presented with sudden onset slurring of speech started 7 am in the morning of admission and resolved by the time she came to the ED.  Patient is legally blind , has debilitating arthritis that  has confined her to a wheelchair, CAD with CABG 2008, Hypertension, a hysterectomy for uterine cancer.  In ED was found to be in Afib- new onset , Leukocytosis 11.8. CTAngio showing tree in bud opacity of right upper lung concerning for infection or pneumonia.  Head CT show no acute CVA or hemorrhage.  CTA head showing multifocal stenosis intracranial arteries. 75% right carotid bulb stenosis. (17 Aug 2019 16:43)    REVIEW OF SYSTEMS:    Patient denied fever, chills, abdominal pain, nausea, vomiting, cough, shortness of breath, chest pain or palpitations    Vital Signs Last 24 Hrs  T(C): 36.8 (18 Aug 2019 11:29), Max: 37.1 (18 Aug 2019 00:44)  T(F): 98.2 (18 Aug 2019 11:29), Max: 98.7 (18 Aug 2019 00:44)  HR: 68 (18 Aug 2019 11:29) (63 - 68)  BP: 108/69 (18 Aug 2019 11:29) (108/69 - 133/66)  BP(mean): --  RR: 18 (18 Aug 2019 11:29) (16 - 19)  SpO2: 97% (18 Aug 2019 11:29) (97% - 99%)I&O's Summary    PHYSICAL EXAM:  GENERAL: NAD,   HEENT: PERRL, +EOMI, anicteric, no [place]  NECK: Supple, No JVD   CHEST/LUNG: CTA bilaterally; Normal effort  HEART: S1S2 Normal intensity, no murmurs, gallops or rubs noted  ABDOMEN: Soft, BS Normoactive, NT, ND, no HSM noted  EXTREMITIES:  2+ radial and DP pulses noted, no clubbing, cyanosis, or edema noted, Limited mobility   SKIN: No rashes or lesions noted  NEURO: A&O, no focal deficits noted, CN II-XII intact  PSYCH: Depressed mood and affect; insight/judgement appropriate  LABS:                        13.0   11.88 )-----------( 259      ( 17 Aug 2019 09:17 )             42.4     08-17    137  |  101  |  22.0<H>  ----------------------------<  129<H>  4.2   |  23.0  |  0.60    Ca    10.1      17 Aug 2019 09:17    TPro  7.8  /  Alb  4.3  /  TBili  0.5  /  DBili  x   /  AST  15  /  ALT  13  /  AlkPhos  51  08-17    PT/INR - ( 17 Aug 2019 09:17 )   PT: 12.8 sec;   INR: 1.11 ratio         PTT - ( 17 Aug 2019 09:17 )  PTT:36.7 sec    RADIOLOGY & ADDITIONAL TESTS:    MEDICATIONS:  MEDICATIONS  (STANDING):  aspirin Suppository 300 milliGRAM(s) Rectal daily  atorvastatin 40 milliGRAM(s) Oral at bedtime  azithromycin  IVPB 500 milliGRAM(s) IV Intermittent every 24 hours  cefTRIAXone   IVPB 1000 milliGRAM(s) IV Intermittent every 24 hours  latanoprost 0.005% Ophthalmic Solution 1 Drop(s) Both EYES at bedtime  levothyroxine 50 MICROGram(s) Oral daily    MEDICATIONS  (PRN):
Neurology consult    BENNY ROSS 68y Female   HPI:  Patient is a 68y old  Female who is a nursing home resident. Noted to have developed dysarthria around 7 am.   I spoke with the sister at bedside who had spoken with her the night prior and could not corroborate the last seen well kush for sure.  The sister at bedside ( Alexia Rodriguez) via tele stroke reported to me that the patient is back to baseline and her speech is as usual.            MEDICATIONS        PMH: Hyperlipidemia  Hypokalemia  Hypothyroid  Schizophrenia  Bipolar 1 disorder       PSH: History of hip replacement  S/P CABG x 3      Family history: No history of dementia, strokes, or seizures   FAMILY HISTORY:      SOCIAL HISTORY:  No history of tobacco or alcohol use     Allergies    bacitracin (Unknown)  neomycin (Unknown)  Neosporin (Unknown)  polymyxin B ophthalmic (Unknown)  tobramycin (Unknown)    Intolerances          Weight (kg): 65.5 (08-17 @ 09:04)    Vital Signs Last 24 Hrs  T(C): 36.7 (17 Aug 2019 08:53), Max: 36.7 (17 Aug 2019 08:53)  T(F): 98 (17 Aug 2019 08:53), Max: 98 (17 Aug 2019 08:53)  HR: 58 (17 Aug 2019 11:47) (55 - 83)  BP: 130/68 (17 Aug 2019 11:47) (105/54 - 138/72)  BP(mean): --  RR: 16 (17 Aug 2019 11:47) (16 - 18)  SpO2: 97% (17 Aug 2019 11:47) (96% - 97%)      On Neurological Examination:    Patient examined via Telestroke Robot camera with assistance from ED MD dr Peters at bedside and the patien'ts sister at bedside as well.  AAOx3. speech appeared mildly dysarthric but at baseline per sister. Patient is reported to be legally blind at baseline so cannot asses VF  Susutained lateral beating nystagmus noted in all gazes without fatigability.  Face appeared symmetric. No aphasia seen. Follows commands briskly.  Motor BUE anti gravity and No drift.  BLE antigravity.      LABS:  CBC Full  -  ( 17 Aug 2019 09:17 )  WBC Count : 11.88 K/uL  RBC Count : 4.99 M/uL  Hemoglobin : 13.0 g/dL  Hematocrit : 42.4 %  Platelet Count - Automated : 259 K/uL  Mean Cell Volume : 85.0 fl  Mean Cell Hemoglobin : 26.1 pg  Mean Cell Hemoglobin Concentration : 30.7 gm/dL  Auto Neutrophil # : 9.45 K/uL  Auto Lymphocyte # : 1.34 K/uL  Auto Monocyte # : 0.63 K/uL  Auto Eosinophil # : 0.30 K/uL  Auto Basophil # : 0.09 K/uL  Auto Neutrophil % : 79.5 %  Auto Lymphocyte % : 11.3 %  Auto Monocyte % : 5.3 %  Auto Eosinophil % : 2.5 %  Auto Basophil % : 0.8 %                          13.0   11.88 )-----------( 259      ( 17 Aug 2019 09:17 )             42.4     08-17    137  |  101  |  22.0<H>  ----------------------------<  129<H>  4.2   |  23.0  |  0.60    Ca    10.1      17 Aug 2019 09:17    TPro  7.8  /  Alb  4.3  /  TBili  0.5  /  DBili  x   /  AST  15  /  ALT  13  /  AlkPhos  51  08-17        PT/INR - ( 17 Aug 2019 09:17 )   PT: 12.8 sec;   INR: 1.11 ratio         PTT - ( 17 ug 2019 09:17 )  PTT:36.7 sec    RADIOLOGY & ADDITIONAL STUDIES:    CT head    < from: CT Head No Cont (08.17.19 @ 09:06) >      IMPRESSION: No acute intracranial hemorrhage or mass effect.   If clinical suspicion for acute infarct persists, brain MRI can be   obtained.       < end of copied text >      CTA Head and Neck:    IMPRESSION:   CT angiography neck:   1.  Severe calcified atherosclerotic plaque at the right carotid bulb and   proximal right cervical ICA. Approximately 75% stenosis of the proximal   right cervical ICA by NASCET criteria.   2.  Enlarged 1.6 cm left level 2A lymph node, nonspecific.  3.  Tree-in-bud opacities in the right upper lung suggesting infection.    CT angiography brain:     1.  No major vessel occlusion. No evidence of aneurysm.  2.  Multifocal stenoses of intracranial arteries as described.        IMP:  79 YO F brought to [facility] with c/o of new onset dysarthria. Family reports her speech is at baseline.  D/Dx includes TIA versus small infarct.    Rec:  - Admit to [facility] for TIA work up.  - tPA is not indicated as patient is back to her baseline.  -No LVO seen so patient is not an endovascular intervention candidate.  MRI Brain to see if the R ICA stenosis is symptomatic.  - Further work up and management per stroke neurology service at [facility].  -discussed with sister and  at bedside
SUBJECTIVE:  "When am I having my echocardiogram?"  DIOMEDES o/n.     OBJECTIVE:    MEDICATIONS  (STANDING):  aspirin Suppository 300 milliGRAM(s) Rectal daily  atorvastatin 40 milliGRAM(s) Oral at bedtime  azithromycin  IVPB 500 milliGRAM(s) IV Intermittent every 24 hours  cefTRIAXone   IVPB 1000 milliGRAM(s) IV Intermittent every 24 hours  latanoprost 0.005% Ophthalmic Solution 1 Drop(s) Both EYES at bedtime  levothyroxine 50 MICROGram(s) Oral daily  thiothixene 4 milliGRAM(s) Oral <User Schedule>    MEDICATIONS  (PRN):        Vital Signs Last 24 Hrs  T(C): 37.1 (19 Aug 2019 04:54), Max: 37.1 (19 Aug 2019 04:54)  T(F): 98.8 (19 Aug 2019 04:54), Max: 98.8 (19 Aug 2019 04:54)  HR: 85 (19 Aug 2019 08:00) (66 - 90)  BP: 119/59 (19 Aug 2019 08:00) (102/50 - 134/73)  BP(mean): 76 (19 Aug 2019 08:00) (76 - 89)  RR: 16 (19 Aug 2019 08:00) (15 - 21)  SpO2: 98% (19 Aug 2019 08:00) (97% - 100%)      PHYSICAL EXAM:  Constitutional: NAD, AAOx3, lying in bed eating breakfast  Cardiovascular: +S1S2 irr rhythm  Pulmonary: CTA b/l, unlabored  GI: soft NTND +BS  Extremities: no pedal edema, +distal pulses b/l  Neuro: non focal, ROSARIO x4  Psych: appropriate mood & affect      LABS:                        11.7   7.93  )-----------( 250      ( 19 Aug 2019 05:52 )             37.6     08-19    140  |  104  |  20.0  ----------------------------<  88  4.2   |  26.0  |  0.60    Ca    9.0      19 Aug 2019 05:52    TPro  7.8  /  Alb  4.3  /  TBili  0.5  /  DBili  x   /  AST  15  /  ALT  13  /  AlkPhos  51  08-17  PT/INR - ( 17 Aug 2019 09:17 )   PT: 12.8 sec;   INR: 1.11 ratio    PTT - ( 17 Aug 2019 09:17 )  PTT:36.7 sec      RADIOLOGY & ADDITIONAL TESTS:  TTE pending      EP DATA:  Telemetry reveals rate-controlled AF
BENNY ROSS Female 68y [id number]    Patient is a 68y old  Female who presents with a chief complaint of Slurred speech (19 Aug 2019 10:41)      Subjective/objective:  Pt seen and examined at bedside, by Attending and PA. Pt is sitting up in bed, childlike affect but awake and alert. NAD noted, sister Alexia is at the bedside.     Review of system:  No fever, chills, nausea, vomiting, headache, dizziness, chest pain, SOB or palpitation.      PHYSICAL EXAM:    Vital Signs Last 24 Hrs  T(C): 37.2 (19 Aug 2019 08:52), Max: 37.2 (19 Aug 2019 08:52)  T(F): 98.9 (19 Aug 2019 08:52), Max: 98.9 (19 Aug 2019 08:52)  HR: 80 (19 Aug 2019 12:27) (66 - 90)  BP: 133/81 (19 Aug 2019 12:27) (102/50 - 134/73)  BP(mean): 96 (19 Aug 2019 12:27) (76 - 96)  RR: 16 (19 Aug 2019 12:27) (15 - 21)  SpO2: 96% (19 Aug 2019 12:27) (96% - 100%)    GENERAL: Pt lying comfortably, NAD.  HEENT: NC/AT.  NECK: soft, Supple, No JVD,   CHEST/LUNG: Clear to auscultatation bilaterally; No wheezing.  HEART: S1S2+, Regular rate and rhythm; No murmurs.  ABDOMEN: Soft, Nontender, Nondistended; Bowel sounds present.  MUSCULOSKELETAL: Normal range of motion.  SKIN: No rashes or lesions.  NEURO: AAOX3, speech is at baseline per pt and sister. Affect is somewhat childlike but good historian. Neurologically grossly intact.  PSYCH: normal mood.          MEDICATIONS  (STANDING):  aspirin Suppository 300 milliGRAM(s) Rectal daily  atorvastatin 40 milliGRAM(s) Oral at bedtime  azithromycin  IVPB 500 milliGRAM(s) IV Intermittent every 24 hours  cefTRIAXone   IVPB 1000 milliGRAM(s) IV Intermittent every 24 hours  latanoprost 0.005% Ophthalmic Solution 1 Drop(s) Both EYES at bedtime  levothyroxine 50 MICROGram(s) Oral daily  thiothixene 4 milliGRAM(s) Oral <User Schedule>    MEDICATIONS  (PRN):        Labs:  LABS:                        11.7   7.93  )-----------( 250      ( 19 Aug 2019 05:52 )             37.6     08-19    140  |  104  |  20.0  ----------------------------<  88  4.2   |  26.0  |  0.60    Ca    9.0      19 Aug 2019 05:52        CTA w contrast 8/17/19  IMPRESSION:   CT angiography neck:   1.  Severe calcifiedatherosclerotic plaque at the right carotid bulb and   proximal right cervical ICA. Approximately 75% stenosis of the proximal   right cervical ICA by NASCET criteria.   2.  Enlarged 1.6 cm left level 2A lymph node, nonspecific.  3.  Tree-in-bud opacities in the right upper lung suggesting infection.    CT angiography brain:   1.  No major vessel occlusion. No evidence of aneurysm.  2.  Multifocal stenoses of intracranial arteries as described.      CT chest 8/19/19    IMPRESSION:     Nodular opacities in the right upper lobe and a tree-in-bud type   configuration, likely infectious bronchiolitis.    Mosaic attenuation in the lungs likely secondary to small vessel or small   airway disease.    Enlarged main pulmonary artery suggestive of pulmonary artery   hypertension.      MR head 8/19/19  IMPRESSION: Small linear acute/subacute infarction involving the right   superior cerebellar hemisphere with associated cytotoxic edema. No acute   intracranial hemorrhage or abnormal intracranial enhancement.    Multiple nonspecific abnormal white matter foci of T2/FLAIR prolongation   statistically favoring microvascular disease.    Paranasal sinus inflammatory disease in a right-sided ostiomeatal unit   pattern.
CC :  slurred speech   seen  examined , cardiology follow  up appreciated   Pt is feeling well  , no complaints today , normal speech     Vital Signs Last 24 Hrs  T(C): 36.4 (20 Aug 2019 09:20), Max: 37.1 (19 Aug 2019 17:00)  T(F): 97.6 (20 Aug 2019 09:20), Max: 98.7 (19 Aug 2019 17:00)  HR: 65 (20 Aug 2019 07:21) (65 - 96)  BP: 131/73 (20 Aug 2019 09:20) (114/73 - 154/54)  BP(mean): 84 (19 Aug 2019 19:59) (83 - 96)  RR: 18 (20 Aug 2019 09:20) (16 - 20)  SpO2: 98% (20 Aug 2019 09:20) (96% - 99%)    GENERAL: Pt lying comfortably, NAD  NECK: soft, Supple, No JVD,   CHEST/LUNG: Clear to auscultation  bilaterally; No wheezing.  HEART: S1S2+, Regular rate and rhythm; S1 /S2 No murmurs.  ABDOMEN: Soft, Nontender, Nondistended; Bowel sounds present.  NEURO: AAOX3, speech is at baseline per pt and sister. Affect is somewhat childlike but good historian. Neurologically grossly intact.  PSYCH: normal mood., affect                               11.7   7.93  )-----------( 250      ( 19 Aug 2019 05:52 )             37.6   08-19    140  |  104  |  20.0  ----------------------------<  88  4.2   |  26.0  |  0.60    Ca    9.0      19 Aug 2019 05:52          CTA w contrast 8/17/19  IMPRESSION:   CT angiography neck:   1.  Severe calcifiedatherosclerotic plaque at the right carotid bulb and   proximal right cervical ICA. Approximately 75% stenosis of the proximal   right cervical ICA by NASCET criteria.   2.  Enlarged 1.6 cm left level 2A lymph node, nonspecific.  3.  Tree-in-bud opacities in the right upper lung suggesting infection.    CT angiography brain:   1.  No major vessel occlusion. No evidence of aneurysm.  2.  Multifocal stenoses of intracranial arteries as described.      CT chest 8/19/19    IMPRESSION:     Nodular opacities in the right upper lobe and a tree-in-bud type   configuration, likely infectious bronchiolitis.    Mosaic attenuation in the lungs likely secondary to small vessel or small   airway disease.    Enlarged main pulmonary artery suggestive of pulmonary artery   hypertension.      MR head 8/19/19  IMPRESSION: Small linear acute/subacute infarction involving the right   superior cerebellar hemisphere with associated cytotoxic edema. No acute   intracranial hemorrhage or abnormal intracranial enhancement.    Multiple nonspecific abnormal white matter foci of T2/FLAIR prolongation   statistically favoring microvascular disease.    Paranasal sinus inflammatory disease in a right-sided ostiomeatal unit   pattern.                    < from: TTE Echo w/Cont Complete (08.19.19 @ 11:10) >  Summary:   1. Left ventricular ejection fraction, by visual estimation, is 55%.   2. Technically adequate study.   3. Normal global left ventricular systolic function.   4. Basal inferior segment and mid inferolateral segment are abnormal as   described above.   5. LV Ejection Fraction by Siu's Method with a biplane EF of 56 %.   6. Normal left ventricular internal cavity size.   7. The mitral in-flow pattern reveals no discernable A-wave, therefore   no comment on diastolic function can be made.   8. Moderately enlarged right ventricle.   9. There is no evidence of pericardial effusion.  10. Thickening of the anterior mitral valve leaflet.  11. Mild-moderate tricuspid regurgitation.  12. Moderate pulmonic valve regurgitation.  13. Estimated pulmonary artery systolic pressure is 51.2 mmHg assuming a   right atrial pressure of 3 mmHg, which is consistent with moderate   pulmonary hypertension.  14. Endocardial visualization was enhanced with intravenous echo contrast.    < end of copied text >
Carotid duplex reviewed by Dr Baltazar     EXAM:  US DPLX CAROTIDS COMPL BI                          PROCEDURE DATE:  08/19/2019          INTERPRETATION:  US CAROTID DUPLEX COMPLETE BILATERAL    HISTORY:  CVA    COMPARISON: None.    PROCEDURE/TECHNIQUE: Examination consists of gray scale and color flow   evaluation of the extracranial carotid arteries from the level of the   proximal common carotid arteries to the level of the visualized portions   of the distal internal carotid arteries and proximal external carotid   arteries with spectral waveform analysis.  Degree of stenosis is   estimated based on the criteria developed by the consensus panel of the   Society of Radiologists in Ultrasound (Radiology; November 2003).    FINDINGS:     There is mild to moderate plaque in both carotid bulbs. There are blunted   waveforms in the mid and distal right internal carotid artery compatible   with a hemodynamically significant stenosis; the severe calcified plaque   on the CT 8/17/2019 is not seen on the current study, likely for   technical reasons. Systolic velocities in the left internal carotid   artery are within normal limits. There is mild plaque in the distal right   common carotid artery and mid and distal left common carotid artery.    The peak systolic velocities in cm/sec are as follows:    RIGHT CAROTID:  PROX CCA = 68 cm/s  MID CCA = 47 cm/s  DIST CCA = 39 cm/s  PROX ICA = 60 cm/s  MID ICA = 66 cm/s  DIST ICA = 84 cm/s  ECA =66 cm/s  ICA/CCA PSV ratio = 1.5    LEFT CAROTID:    PROX CCA = 86 cm/s  MID CCA = 63 cm/s  DIST CCA =  37 cm/s  PROX ICA = 52 cm/s  MID ICA =  61 cm/s  DIST ICA = 79 cm/s  ECA = 94 cm/s  ICA/CCA PSV ratio = 1.4    VERTEBRAL ARTERIES: The vertebral arteries demonstrate antegrade flow   bilaterally.    An arrhythmia is noted.    IMPRESSION:    Blunted peak systolic waveforms within the mid and distal right internal   carotid artery compatible with an upstream stenosis as seen on CTA neck   8/17/2019; region of stenosis not seen on ultrasound likely for technical   reasons.    Mild to moderate plaque in the left carotid bulb with less than 50% left   internal carotid artery stenosis.    Arrhythmia.    THERE IS NO HEMODYNAMICALLY SIG STENOSIS  PT SHOULD CONT ANTIPLATELET AND STATIN THERAPY  NO VASCULAR SURGICAL INTERVENTION INDICATED AT THIS TIME  VASCULAR SURGERY WILL SIGN OFF
Follow up for atrial fib , C artery stenosis and CVA   d/w Dr Dumont in am  , he is not planning to to TTE cardioversion , can be done outpatient   agree with discharge to NH with medical therapy   pt is seen examined , she feels well, denies any pain , normal speech   also informed the sister at the bedside after my visit re plan of care and discharge       GENERAL: Pt lying comfortably, no distress   NECK: soft, Supple, No JVD,   CHEST/LUNG: Clear to auscultation  bilaterally; No wheezing.  HEART: S1S2+, Regular rate and rhythm; S1 /S2 No murmurs.  ABDOMEN: Soft, Nontender, Nondistended; Bowel sounds present.        CTA w contrast 8/17/19  IMPRESSION:   CT angiography neck:   1.  Severe calcifiedatherosclerotic plaque at the right carotid bulb and   proximal right cervical ICA. Approximately 75% stenosis of the proximal   right cervical ICA by NASCET criteria.   2.  Enlarged 1.6 cm left level 2A lymph node, nonspecific.  3.  Tree-in-bud opacities in the right upper lung suggesting infection.    CT angiography brain:   1.  No major vessel occlusion. No evidence of aneurysm.  2.  Multifocal stenoses of intracranial arteries as described.      CT chest 8/19/19    IMPRESSION:     Nodular opacities in the right upper lobe and a tree-in-bud type   configuration, likely infectious bronchiolitis.    Mosaic attenuation in the lungs likely secondary to small vessel or small   airway disease.    Enlarged main pulmonary artery suggestive of pulmonary artery   hypertension.      MR head 8/19/19  IMPRESSION: Small linear acute/subacute infarction involving the right   superior cerebellar hemisphere with associated cytotoxic edema. No acute   intracranial hemorrhage or abnormal intracranial enhancement.    Multiple nonspecific abnormal white matter foci of T2/FLAIR prolongation   statistically favoring microvascular disease.    Paranasal sinus inflammatory disease in a right-sided ostiomeatal unit   pattern.                    < from: TTE Echo w/Cont Complete (08.19.19 @ 11:10) >  Summary:   1. Left ventricular ejection fraction, by visual estimation, is 55%.   2. Technically adequate study.   3. Normal global left ventricular systolic function.   4. Basal inferior segment and mid inferolateral segment are abnormal as   described above.   5. LV Ejection Fraction by Siu's Method with a biplane EF of 56 %.   6. Normal left ventricular internal cavity size.   7. The mitral in-flow pattern reveals no discernable A-wave, therefore   no comment on diastolic function can be made.   8. Moderately enlarged right ventricle.   9. There is no evidence of pericardial effusion.  10. Thickening of the anterior mitral valve leaflet.  11. Mild-moderate tricuspid regurgitation.  12. Moderate pulmonic valve regurgitation.  13. Estimated pulmonary artery systolic pressure is 51.2 mmHg assuming a   right atrial pressure of 3 mmHg, which is consistent with moderate   pulmonary hypertension.  14. Endocardial visualization was enhanced with intravenous echo contrast.    < end of copied text >
Patient doing well this AM.   Reports no pain.   Slept well.     REVIEW OF SYSTEMS  Constitutional - No fever,  +fatigue  Neurological - +loss of strength    FUNCTIONAL PROGRESS  8/19  Bed Mobility: Rolling/Turning:     · Level of [place]	moderate assist (50% patients effort)	  · Physical Assist/Nonphysical Assist	1 person assist; nonverbal cues (demo/gestures); verbal cues	  · Assistive Device	bed rails	    Bed Mobility: Scooting/Bridging:     · Level of [place]	moderate assist (50% patients effort); scooting	  · Physical Assist/Nonphysical Assist	2 person assist; verbal cues; nonverbal cues (demo/gestures)	    Bed Mobility: Sit to Supine:     · Level of [place]	maximum assist (25% patients effort)	  · Physical Assist/Nonphysical Assist	1 person assist; nonverbal cues (demo/gestures); verbal cues	    Bed Mobility: Supine to Sit:     · Level of [place]	moderate assist (50% patients effort)	  · Physical Assist/Nonphysical Assist	1 person assist; nonverbal cues (demo/gestures); verbal cues	  · Assistive Device	bed rails	    Transfer: Sit to Stand:     · Level of [place]	moderate assist (50% patients effort)	  · Physical Assist/Nonphysical Assist	1 person assist; nonverbal cues (demo/gestures); verbal cues	  · Weight-Bearing Restrictions	weight-bearing as tolerated	    Transfer: Stand to Sit:     · Level of [place]	moderate assist (50% patients effort)	  · Physical Assist/Nonphysical Assist	1 person assist; nonverbal cues (demo/gestures); verbal cues	  · Weight-Bearing Restrictions	weight-bearing as tolerated	    Bathing Training:     · Level of [place]	maximum assist (25% patients effort); sponge	  · Physical Assist/Nonphysical Assist	1 person assist; nonverbal cues (demo/gestures); verbal cues; set-up required	    Upper Body Dressing Training:     · Level of [place]	maximum assist (25% patients effort); gown	  · Physical Assist/Nonphysical Assist	1 person assist; nonverbal cues (demo/gestures); verbal cues	    Lower Body Dressing Training:     · Level of [place]	dependent (less than 25% patients effort); socks	  · Physical Assist/Nonphysical Assist	1 person assist; nonverbal cues (demo/gestures); verbal cues	    Toilet Hygiene Training:     · Level of [place]	dependent (less than 25% patients effort); external female catheter	  · Physical Assist/Nonphysical Assist	1 person assist	    Grooming Training:     · Level of [place]	minimum assist (75% patients effort)	  · Physical Assist/Nonphysical Assist	1 person assist; nonverbal cues (demo/gestures); verbal cues; set-up required	    Eating/Self-Feeding Training:     · Level of [place]	supervision; pt finishing breakfast upon receiving pt; pt with active orders for supervision with meals and aspiration precautions	  · Physical Assist/Nonphysical Assist	set-up required; verbal cues	          VITALS  T(C): 36.4 (08-20-19 @ 09:20), Max: 37.1 (08-19-19 @ 17:00)  HR: 65 (08-20-19 @ 07:21) (65 - 96)  BP: 131/73 (08-20-19 @ 09:20) (114/73 - 154/54)  RR: 18 (08-20-19 @ 09:20) (16 - 20)  SpO2: 98% (08-20-19 @ 09:20) (96% - 99%)  Wt(kg): --    MEDICATIONS   apixaban 5 milliGRAM(s) every 12 hours  aspirin  chewable 81 milliGRAM(s) daily  atorvastatin 40 milliGRAM(s) at bedtime  azithromycin  IVPB 500 milliGRAM(s) every 24 hours  cefTRIAXone   IVPB 1000 milliGRAM(s) every 24 hours  latanoprost 0.005% Ophthalmic Solution 1 Drop(s) at bedtime  levothyroxine 50 MICROGram(s) daily  thiothixene 4 milliGRAM(s) <User Schedule>      RECENT LABS - Reviewed                        11.7   7.93  )-----------( 250      ( 19 Aug 2019 05:52 )             37.6     08-19    140  |  104  |  20.0  ----------------------------<  88  4.2   |  26.0  |  0.60    Ca    9.0      19 Aug 2019 05:52              ----------------------------------------------------------------------------------------  PHYSICAL EXAM  Constitutional - NAD, Comfortably lying in bed  HEENT - NCAT, EOMI  Neck - Supple, No limited ROM   Extremities - No C/C/E, No calf tenderness   Neurologic Exam -                    Cognitive - Awake, Alert, AAO to self, place, date, year, situation     Communication - Fluent, No dysarthria     Cranial Nerves - CN 2-12 intact     Motor -                     LEFT    UE - ShAB 3/5, EF 4/5, EE 4/5, WE 4/5,  4/5                    RIGHT UE - ShAB 3/5, EF 4/5, EE 4/5, WE 4/5,  4/5                    LEFT    LE - HF 3/5, KE 4/5, DF 4/5, PF 4/5                    RIGHT LE - HF 3/5, KE 4/5, DF 4/5, PF 4/5        Sensory - Intact to LT     Reflexes - DTR Intact     Coordination - FTN intact  Psychiatric - Mood stable, Affect WNL      ----------------------------------------------------------------------------------------  ASSESSMENT/PLAN  68y Female with functional deficits after TIA causing slurred speech.  TIA - CTH negative, MRI showed a small linear acute/subacute infarction involving the right superior cerebellar hemisphere with associated cytotoxic edema, patient is back at baseline functional status, Aspirin, Lipitor  Pneumonia - Ceftriaxone, Azithromycin  Hypothyroidism - Synthroid  Schizophrenia - Navane  DVT PPX - SCDs  Rehab - Continue to recommend [facility].     Will sign off, please reconsult for additional rehab dispo needs if functional status changes.
SUBJECTIVE:  "I'm hungry- when's breakfast?"  DIOMEDES o/n- transferred out of SDU to     OBJECTIVE:    MEDICATIONS  (STANDING):  apixaban 5 milliGRAM(s) Oral every 12 hours  aspirin Suppository 300 milliGRAM(s) Rectal daily  atorvastatin 40 milliGRAM(s) Oral at bedtime  azithromycin  IVPB 500 milliGRAM(s) IV Intermittent every 24 hours  cefTRIAXone   IVPB 1000 milliGRAM(s) IV Intermittent every 24 hours  latanoprost 0.005% Ophthalmic Solution 1 Drop(s) Both EYES at bedtime  levothyroxine 50 MICROGram(s) Oral daily  thiothixene 4 milliGRAM(s) Oral <User Schedule>      Vital Signs Last 24 Hrs  T(C): 36.6 (20 Aug 2019 00:26), Max: 37.2 (19 Aug 2019 08:52)  T(F): 97.8 (20 Aug 2019 00:26), Max: 98.9 (19 Aug 2019 08:52)  HR: 85 (20 Aug 2019 00:26) (79 - 96)  BP: 129/78 (20 Aug 2019 00:26) (114/73 - 154/54)  BP(mean): 84 (19 Aug 2019 19:59) (83 - 96)  RR: 18 (20 Aug 2019 00:26) (16 - 20)  SpO2: 98% (20 Aug 2019 00:26) (96% - 99%)      PHYSICAL EXAM:  Constitutional: NAD, AAOx3, sleeping  Cardiovascular: +S1S2 irr RR  Pulmonary: CTA b/l, unlabored  GI: soft NTND +BS  Extremities: no pedal edema, +distal pulses b/l  Neuro: non focal, ROSARIO x4  Psych: appropriate mood & affect      LABS:                        11.7   7.93  )-----------( 250      ( 19 Aug 2019 05:52 )             37.6     08-19    140  |  104  |  20.0  ----------------------------<  88  4.2   |  26.0  |  0.60    Ca    9.0      19 Aug 2019 05:52      RADIOLOGY & ADDITIONAL TESTS:  < from: TTE Echo w/Cont Complete (08.19.19 @ 11:10) >  Summary:   1. Left ventricular ejection fraction, by visual estimation, is 55%.   2. Technically adequate study.   3. Normal global left ventricular systolic function.   4. Basal inferior segment and mid inferolateral segment are abnormal as described above.   5. LV Ejection Fraction by Siu's Method with a biplane EF of 56 %.   6. Normal left ventricular internal cavity size.   7. The mitral in-flow pattern reveals no discernable A-wave, therefore no comment on diastolic function can be made.   8. Moderately enlarged right ventricle.   9. There is no evidence of pericardial effusion.  10. Thickening of the anterior mitral valve leaflet.  11. Mild-moderate tricuspid regurgitation.  12. Moderate pulmonic valve regurgitation.  13. Estimated pulmonary artery systolic pressure is 51.2 mmHg assuming a right atrial pressure of 3 mmHg, which is consistent with moderate pulmonary hypertension.  14. Endocardial visualization was enhanced with intravenous echo contrast.    < from: MR Head w/wo IV Cont (08.19.19 @ 11:01) >  IMPRESSION: Small linear acute/subacute infarction involving the right   superior cerebellar hemisphere with associated cytotoxic edema. No acute   intracranial hemorrhage or abnormal intracranial enhancement.    Multiple nonspecific abnormal white matter foci of T2/FLAIR prolongation   statistically favoring microvascular disease.    Paranasal sinus inflammatory disease in a right-sided ostiomeatal unit   pattern.      EP DATA:  Telemetry remains with rate controlled AF
SUBJECTIVE:  "I'm scared."  DIOMEDES o/n.      OBJECTIVE:    MEDICATIONS  (STANDING):  apixaban 5 milliGRAM(s) Oral every 12 hours  aspirin  chewable 81 milliGRAM(s) Oral daily  atorvastatin 40 milliGRAM(s) Oral at bedtime  azithromycin  IVPB 500 milliGRAM(s) IV Intermittent every 24 hours  cefTRIAXone   IVPB 1000 milliGRAM(s) IV Intermittent every 24 hours  latanoprost 0.005% Ophthalmic Solution 1 Drop(s) Both EYES at bedtime  levothyroxine 50 MICROGram(s) Oral daily  metoprolol tartrate 25 milliGRAM(s) Oral two times a day  thiothixene 4 milliGRAM(s) Oral <User Schedule>      Vital Signs Last 24 Hrs  T(C): 36.5 (21 Aug 2019 00:47), Max: 36.5 (21 Aug 2019 00:47)  T(F): 97.7 (21 Aug 2019 00:47), Max: 97.7 (21 Aug 2019 00:47)  HR: 54 (21 Aug 2019 06:26) (54 - 74)  BP: 121/62 (21 Aug 2019 06:26) (119/72 - 131/73)  BP(mean): --  RR: 18 (21 Aug 2019 00:47) (18 - 19)  SpO2: 99% (21 Aug 2019 00:47) (98% - 100%)      PHYSICAL EXAM:  Constitutional: NAD, AAOx3, lying in bed partially sleeping  Cardiovascular: +S1S2 RRR  Pulmonary: CTA b/l, unlabored  GI: soft NTND +BS  Extremities: no pedal edema, +distal pulses b/l  Neuro: non focal, ROSARIO x4  Psych: appropriate mood & affect      RADIOLOGY & ADDITIONAL TESTS:  < from: Nuclear Stress Test-Pharmacologic (08.20.19 @ 12:32) >  IMPRESSIONS:Abnormal Study  * Chest Pain: No chest pain with administration of  Regadenoson.  * Symptom: No Symptom.  * HR Response: Appropriate.  * BP Response: Appropriate.  * Heart Rhythm: Normal Sinus Rhythm.  * Baseline ECG: Normal sinus with IVCD.  * ECG Abnormalities: There were no diagnostic changes.  * Arrhythmia: 2 VPDs occurred 2. Frequency of VPDs 3.  * Medium to large area of Severe entire inferior wall  ischemia.  * Post-stress resting myocardial perfusion gated SPECT  imaging was performed (LVEF = 62 %;LVEDV = 92 ml.)    Conclusion:  Medium to large area of Severe entire inferior wall  ischemia.      EP DATA:  Telemetry reveals rate controlled AF from 50s-70s
[facility]                                        Neurology at [place]                                 David Braun, & Yassine                                  [address]                                        [place], [place], [address]                                             [phone]        CC: TIA     HPI:   The patient is a 68y Female who presented with dysarthria that has since resolved.  It started at 0700 on 8/17, she came to ED was evaluated by tele-stroke and alteplase was not given due to resolved symptoms and unclear time of onset.  She is at her baseline currently.  She denied any weakness or numbness or dizziness at the time of dysarthria.     Interim history:  No on 4 Jb.   No recurrence of symptoms.    ROS:   Denies headache or dizziness.  Denies chest pain.  Denies shortness of breath.    MEDICATIONS  (STANDING):  aspirin Suppository 300 milliGRAM(s) Rectal daily  atorvastatin 40 milliGRAM(s) Oral at bedtime  azithromycin  IVPB 500 milliGRAM(s) IV Intermittent every 24 hours  cefTRIAXone   IVPB 1000 milliGRAM(s) IV Intermittent every 24 hours  latanoprost 0.005% Ophthalmic Solution 1 Drop(s) Both EYES at bedtime  levothyroxine 50 MICROGram(s) Oral daily  thiothixene 4 milliGRAM(s) Oral <User Schedule>      Vital Signs Last 24 Hrs  T(C): 37.2 (19 Aug 2019 08:52), Max: 37.2 (19 Aug 2019 08:52)  T(F): 98.9 (19 Aug 2019 08:52), Max: 98.9 (19 Aug 2019 08:52)  HR: 85 (19 Aug 2019 08:00) (66 - 90)  BP: 119/59 (19 Aug 2019 08:00) (102/50 - 134/73)  BP(mean): 76 (19 Aug 2019 08:00) (76 - 89)  RR: 16 (19 Aug 2019 08:00) (15 - 21)  SpO2: 98% (19 Aug 2019 08:00) (97% - 100%)    Detailed Neurologic Exam:    Mental status: The patient is awake and alert. There is no aphasia. There is no dysarthria.     Cranial nerves: Pupils equal and react symmetrically to light. There is no visual field deficit to threat. Extraocular motion is full with no nystagmus. There is no ptosis. Facial sensation is intact. Facial musculature is symmetric. Palate elevates symmetrically. Tongue is midline.    Motor: There is normal bulk and tone.  There is no tremor.  Strength grossly symmetric although diffusely weak.     Sensation: Grossly intact to light touch and pin.    Reflexes: 1+ throughout and plantar responses are flexor.    Cerebellar: No dysmetria on finger nose testing.    Labs:     08-19    140  |  104  |  20.0  ----------------------------<  88  4.2   |  26.0  |  0.60    Ca    9.0      19 Aug 2019 05:52                              11.7   7.93  )-----------( 250      ( 19 Aug 2019 05:52 )             37.6

## 2019-08-21 NOTE — PROGRESS NOTE ADULT - ASSESSMENT
68 yr female Bipolar and schizophrenia, CAD, CABG, Hypertension , arthritis  presenting with sudden onset slurred speech, mental status changes that resolved and was found to be in AFIB, seen by neurology and cardiology , she is converted to NSR , started on eliquis , MR positive for stroke , TTE with wall motion abnormalities , stress test ordered , pt declined cardiac cath r/o ischemic heart disaesae     1- atrial fibrillation  rate controlled   cont anticoagulation and metoprolol   VAHID CV was deferred  on this admission   follow up outpatient     2- Cerebrovascular accident (CVA) due to other mechanism.  MR brain w right cerebellar stroke  cont aspirin     3-Carotid artery  stenosis on the right   vascular input appreciated   CTA brain w multifocal stenosis intracranial arteries- no major vessel occlusion  Continue Lipitor 40 mg LDL at goal  Aspirin 81 mg daily    4- Other schizophrenia.  Plan: Thiothixene 4mg po daily      5-Acquired hypothyroidism.  Plan: levothyroxine 50mcg po daily.     6-Mixed hyperlipidemia. Plan: atorvastatin 40mg po daily.    7-Essential hypertension. resume home meds      8- Pneumonia, bacterial.-  Right lung opacity seen on CTA neck concerning for gram positive bacterial pneumonia is present on admission   incidental findings   Leukocytosis resolving   complete course of therapy change to po abx   Disposition home vs [facility]LEX pending stress test

## 2019-09-29 PROCEDURE — 97163 PT EVAL HIGH COMPLEX 45 MIN: CPT

## 2019-09-29 PROCEDURE — 70498 CT ANGIOGRAPHY NECK: CPT

## 2019-09-29 PROCEDURE — 80053 COMPREHEN METABOLIC PANEL: CPT

## 2019-09-29 PROCEDURE — 80061 LIPID PANEL: CPT

## 2019-09-29 PROCEDURE — C8929: CPT

## 2019-09-29 PROCEDURE — 70553 MRI BRAIN STEM W/O & W/DYE: CPT

## 2019-09-29 PROCEDURE — 96374 THER/PROPH/DIAG INJ IV PUSH: CPT | Mod: XU

## 2019-09-29 PROCEDURE — 93880 EXTRACRANIAL BILAT STUDY: CPT

## 2019-09-29 PROCEDURE — 36415 COLL VENOUS BLD VENIPUNCTURE: CPT

## 2019-09-29 PROCEDURE — 93017 CV STRESS TEST TRACING ONLY: CPT

## 2019-09-29 PROCEDURE — 71250 CT THORAX DX C-: CPT

## 2019-09-29 PROCEDURE — 70496 CT ANGIOGRAPHY HEAD: CPT

## 2019-09-29 PROCEDURE — 86803 HEPATITIS C AB TEST: CPT

## 2019-09-29 PROCEDURE — 78452 HT MUSCLE IMAGE SPECT MULT: CPT

## 2019-09-29 PROCEDURE — 83036 HEMOGLOBIN GLYCOSYLATED A1C: CPT

## 2019-09-29 PROCEDURE — 85610 PROTHROMBIN TIME: CPT

## 2019-09-29 PROCEDURE — 80048 BASIC METABOLIC PNL TOTAL CA: CPT

## 2019-09-29 PROCEDURE — 84443 ASSAY THYROID STIM HORMONE: CPT

## 2019-09-29 PROCEDURE — 82962 GLUCOSE BLOOD TEST: CPT

## 2019-09-29 PROCEDURE — 85730 THROMBOPLASTIN TIME PARTIAL: CPT

## 2019-09-29 PROCEDURE — 93005 ELECTROCARDIOGRAM TRACING: CPT

## 2019-09-29 PROCEDURE — 99291 CRITICAL CARE FIRST HOUR: CPT | Mod: 25

## 2019-09-29 PROCEDURE — 85027 COMPLETE CBC AUTOMATED: CPT

## 2019-09-29 PROCEDURE — A9500: CPT

## 2019-09-29 PROCEDURE — 97167 OT EVAL HIGH COMPLEX 60 MIN: CPT

## 2019-09-29 PROCEDURE — 70450 CT HEAD/BRAIN W/O DYE: CPT

## 2019-11-23 NOTE — ED ADULT NURSE REASSESSMENT NOTE - NURSING NEURO LEVEL OF CONSCIOUSNESS
alert and awake/follows commands
follows commands/alert and awake
alert and awake/follows commands
You can access the FollowMyHealth Patient Portal offered by [facility] by registering at the following website: [email or web address]. By joining [facility]’s FollowMyHealth portal, you will also be able to view your health information using other applications (apps) compatible with our system.

## 2023-02-20 ENCOUNTER — EMERGENCY (EMERGENCY)
Facility: HOSPITAL | Age: 73
LOS: 1 days | Discharge: HOME | End: 2023-02-20
Attending: EMERGENCY MEDICINE
Payer: MEDICARE

## 2023-02-20 VITALS
DIASTOLIC BLOOD PRESSURE: 107 MMHG | HEART RATE: 77 BPM | OXYGEN SATURATION: 92 % | RESPIRATION RATE: 18 BRPM | TEMPERATURE: 97 F | SYSTOLIC BLOOD PRESSURE: 160 MMHG

## 2023-02-20 DIAGNOSIS — Z95.1 PRESENCE OF AORTOCORONARY BYPASS GRAFT: Chronic | ICD-10-CM

## 2023-02-20 DIAGNOSIS — Z96.649 PRESENCE OF UNSPECIFIED ARTIFICIAL HIP JOINT: Chronic | ICD-10-CM

## 2023-02-20 PROBLEM — M62.81 MUSCLE WEAKNESS (GENERALIZED): Chronic | Status: ACTIVE | Noted: 2019-08-17

## 2023-02-20 PROBLEM — E03.9 HYPOTHYROIDISM, UNSPECIFIED: Chronic | Status: ACTIVE | Noted: 2019-08-17

## 2023-02-20 PROBLEM — E87.6 HYPOKALEMIA: Chronic | Status: ACTIVE | Noted: 2019-08-17

## 2023-02-20 PROBLEM — E78.5 HYPERLIPIDEMIA, UNSPECIFIED: Chronic | Status: ACTIVE | Noted: 2019-08-17

## 2023-02-20 PROBLEM — F31.9 BIPOLAR DISORDER, UNSPECIFIED: Chronic | Status: ACTIVE | Noted: 2019-08-17

## 2023-02-20 PROBLEM — F20.9 SCHIZOPHRENIA, UNSPECIFIED: Chronic | Status: ACTIVE | Noted: 2019-08-17

## 2023-02-20 PROBLEM — L20.84 INTRINSIC (ALLERGIC) ECZEMA: Chronic | Status: ACTIVE | Noted: 2019-08-17

## 2023-02-20 LAB
ALBUMIN SERPL ELPH-MCNC: 3.8 G/DL — SIGNIFICANT CHANGE UP (ref 3.3–5.2)
ALP SERPL-CCNC: 51 U/L — SIGNIFICANT CHANGE UP (ref 40–120)
ALT FLD-CCNC: 17 U/L — SIGNIFICANT CHANGE UP
ANION GAP SERPL CALC-SCNC: 15 MMOL/L — SIGNIFICANT CHANGE UP (ref 5–17)
APTT BLD: 41.3 SEC — HIGH (ref 27.5–35.5)
AST SERPL-CCNC: 42 U/L — HIGH
BASOPHILS # BLD AUTO: 0.07 K/UL — SIGNIFICANT CHANGE UP (ref 0–0.2)
BASOPHILS NFR BLD AUTO: 0.7 % — SIGNIFICANT CHANGE UP (ref 0–2)
BILIRUB SERPL-MCNC: 0.3 MG/DL — LOW (ref 0.4–2)
BLD GP AB SCN SERPL QL: SIGNIFICANT CHANGE UP
BUN SERPL-MCNC: 32.9 MG/DL — HIGH (ref 8–20)
CALCIUM SERPL-MCNC: 9.6 MG/DL — SIGNIFICANT CHANGE UP (ref 8.4–10.5)
CHLORIDE SERPL-SCNC: 101 MMOL/L — SIGNIFICANT CHANGE UP (ref 96–108)
CO2 SERPL-SCNC: 22 MMOL/L — SIGNIFICANT CHANGE UP (ref 22–29)
CREAT SERPL-MCNC: 0.89 MG/DL — SIGNIFICANT CHANGE UP (ref 0.5–1.3)
EGFR: 68 ML/MIN/1.73M2 — SIGNIFICANT CHANGE UP
EOSINOPHIL # BLD AUTO: 0.37 K/UL — SIGNIFICANT CHANGE UP (ref 0–0.5)
EOSINOPHIL NFR BLD AUTO: 3.6 % — SIGNIFICANT CHANGE UP (ref 0–6)
GLUCOSE SERPL-MCNC: 87 MG/DL — SIGNIFICANT CHANGE UP (ref 70–99)
HCT VFR BLD CALC: 37.4 % — SIGNIFICANT CHANGE UP (ref 34.5–45)
HGB BLD-MCNC: 12.1 G/DL — SIGNIFICANT CHANGE UP (ref 11.5–15.5)
IMM GRANULOCYTES NFR BLD AUTO: 0.3 % — SIGNIFICANT CHANGE UP (ref 0–0.9)
INR BLD: 1.31 RATIO — HIGH (ref 0.88–1.16)
LYMPHOCYTES # BLD AUTO: 1.9 K/UL — SIGNIFICANT CHANGE UP (ref 1–3.3)
LYMPHOCYTES # BLD AUTO: 18.7 % — SIGNIFICANT CHANGE UP (ref 13–44)
MCHC RBC-ENTMCNC: 28.1 PG — SIGNIFICANT CHANGE UP (ref 27–34)
MCHC RBC-ENTMCNC: 32.4 GM/DL — SIGNIFICANT CHANGE UP (ref 32–36)
MCV RBC AUTO: 87 FL — SIGNIFICANT CHANGE UP (ref 80–100)
MONOCYTES # BLD AUTO: 0.71 K/UL — SIGNIFICANT CHANGE UP (ref 0–0.9)
MONOCYTES NFR BLD AUTO: 7 % — SIGNIFICANT CHANGE UP (ref 2–14)
NEUTROPHILS # BLD AUTO: 7.06 K/UL — SIGNIFICANT CHANGE UP (ref 1.8–7.4)
NEUTROPHILS NFR BLD AUTO: 69.7 % — SIGNIFICANT CHANGE UP (ref 43–77)
PLATELET # BLD AUTO: 267 K/UL — SIGNIFICANT CHANGE UP (ref 150–400)
POTASSIUM SERPL-MCNC: 4.3 MMOL/L — SIGNIFICANT CHANGE UP (ref 3.5–5.3)
POTASSIUM SERPL-SCNC: 4.3 MMOL/L — SIGNIFICANT CHANGE UP (ref 3.5–5.3)
PROT SERPL-MCNC: 7.2 G/DL — SIGNIFICANT CHANGE UP (ref 6.6–8.7)
PROTHROM AB SERPL-ACNC: 15.2 SEC — HIGH (ref 10.5–13.4)
RBC # BLD: 4.3 M/UL — SIGNIFICANT CHANGE UP (ref 3.8–5.2)
RBC # FLD: 14.8 % — HIGH (ref 10.3–14.5)
SODIUM SERPL-SCNC: 138 MMOL/L — SIGNIFICANT CHANGE UP (ref 135–145)
WBC # BLD: 10.14 K/UL — SIGNIFICANT CHANGE UP (ref 3.8–10.5)
WBC # FLD AUTO: 10.14 K/UL — SIGNIFICANT CHANGE UP (ref 3.8–10.5)

## 2023-02-20 PROCEDURE — 85610 PROTHROMBIN TIME: CPT

## 2023-02-20 PROCEDURE — 80053 COMPREHEN METABOLIC PANEL: CPT

## 2023-02-20 PROCEDURE — 86900 BLOOD TYPING SEROLOGIC ABO: CPT

## 2023-02-20 PROCEDURE — 85025 COMPLETE CBC W/AUTO DIFF WBC: CPT

## 2023-02-20 PROCEDURE — 86850 RBC ANTIBODY SCREEN: CPT

## 2023-02-20 PROCEDURE — G1004: CPT

## 2023-02-20 PROCEDURE — 75635 CT ANGIO ABDOMINAL ARTERIES: CPT | Mod: 26,ME

## 2023-02-20 PROCEDURE — 36415 COLL VENOUS BLD VENIPUNCTURE: CPT

## 2023-02-20 PROCEDURE — 85730 THROMBOPLASTIN TIME PARTIAL: CPT

## 2023-02-20 PROCEDURE — 99284 EMERGENCY DEPT VISIT MOD MDM: CPT | Mod: 25

## 2023-02-20 PROCEDURE — 99285 EMERGENCY DEPT VISIT HI MDM: CPT

## 2023-02-20 PROCEDURE — 75635 CT ANGIO ABDOMINAL ARTERIES: CPT | Mod: ME

## 2023-02-20 PROCEDURE — 86901 BLOOD TYPING SEROLOGIC RH(D): CPT

## 2023-02-20 RX ORDER — ACETAMINOPHEN 500 MG
650 TABLET ORAL ONCE
Refills: 0 | Status: COMPLETED | OUTPATIENT
Start: 2023-02-20 | End: 2023-02-20

## 2023-02-20 RX ORDER — LIDOCAINE 4 G/100G
1 CREAM TOPICAL ONCE
Refills: 0 | Status: COMPLETED | OUTPATIENT
Start: 2023-02-20 | End: 2023-02-20

## 2023-02-20 RX ADMIN — LIDOCAINE 1 PATCH: 4 CREAM TOPICAL at 16:15

## 2023-02-20 RX ADMIN — Medication 650 MILLIGRAM(S): at 16:15

## 2023-02-20 NOTE — ED PROVIDER NOTE - CARE PROVIDER_API CALL
Houston Moura)  Surgery Vascular  [address] [place] [id number]  Phone: [phone]  Fax: [phone]  Follow Up Time:

## 2023-02-20 NOTE — ED ADULT NURSE REASSESSMENT NOTE - NS ED NURSE REASSESS COMMENT FT1
received report from day RN, assumed care of pt at change of shift.  pt is confused at baseline, comes from [facility].  pt is here for left toe/heel pain.  also c/o back pain.  per [facility], pt has discoloration of her toes and pain for 1 month.  no recent falls or trama.  pt is awaiting CT and vascular consult.  currently pt is comfortable. no s/s acute distress noted.

## 2023-02-20 NOTE — ED PROVIDER NOTE - PROGRESS NOTE DETAILS
Saud: Prelim report of CTA Abd Aorta w/run off shows extensive disease (moderate to severe stenoses throughout left femoral artery and popliteal artery with trickle flow via posterior tibial and peroneal arteries. Patent dorsalis pedis artery.  Trickle flow via PTA.) Vascular consulted. Message left with Healthcare Proxy (Alexia - [phone]) for goals of care discussion. Denny: PT seen and evaluated by vascular surgery. Recommending no interventions at this time. Pt stable for discharge and informed of plan. Called [facility] and informed them of pending discharge, was told they would accept her back tonight. Asked unit clerk to arrange ambulance. Called emergency contact but no one answered. Left generic voicemail asking for call back.

## 2023-02-20 NOTE — ED PROVIDER NOTE - ATTENDING CONTRIBUTION TO CARE
72-year-old female with past medical history of CAD status post CABG, CVA, carotid stenosis, hypertension, bipolar, and schizophrenia presents from [facility] due to left heel and toe pain. She has no other complaints besides chronic back pain. Patient is confused at baseline and does not follow commands.  Per nursing, patient has had developing pain and discoloration on her big toe for 1 month, and over the last week discoloration and pain of the other toes.  She does not see a vascular surgeon at present and has had no recent falls. She is on Eliquis for a history of paroxysmal A-fib.    On exam patient with vascular insufficiency wounds but no acute infection noted.  Decreased palpable pulses on affected limb. labs and  CTA ordered and patient signed out  pending vascular surgery consultation

## 2023-02-20 NOTE — ED PROVIDER NOTE - PATIENT PORTAL LINK FT
You can access the FollowMyHealth Patient Portal offered by [facility] by registering at the following website: [email or web address]. By joining [facility]’s FollowMyHealth portal, you will also be able to view your health information using other applications (apps) compatible with our system.

## 2023-02-20 NOTE — ED PROVIDER NOTE - OBJECTIVE STATEMENT
72-year-old female with past medical history of CAD status post CABG, CVA, carotid stenosis, hypertension, bipolar, and schizophrenia presents from [facility] due to left heel and toe pain. She has no other complaints besides chronic back pain. Patient is confused at baseline and does not follow commands.  Per nursing, patient has had developing pain and discoloration on her big toe for 1 month, and over the last week discoloration and pain of the other toes.  She does not see a vascular surgeon at present and has had no recent falls. She is on Eliquis for a history of paroxysmal A-fib.

## 2023-02-20 NOTE — ED PROVIDER NOTE - NSFOLLOWUPINSTRUCTIONS_ED_ALL_ED_FT
-Follow up with VASCULAR SURGERY AS AN OUTPATIENT for further evaluation and management of your PERIPHERAL VASCULAR DISEASE.     -Contact information for the vascular surgeon consulted on case provided in this paperwork.     -Return to the ER with any new or worsening symptoms including worsening pain, fevers, confusion, bleeding.

## 2023-02-20 NOTE — ED PROVIDER NOTE - CLINICAL SUMMARY MEDICAL DECISION MAKING FREE TEXT BOX
72-year-old female with extensive comorbidities presenting with toe and heel pain likely in the setting of peripheral arterial disease.  First third and fifth digit of the left lower extremity are necrotic.  Patient does not have palpable PT pulses bilaterally, has diminished on equal DP pulses with right greater than left.  Plan to image vasculature of the lower extremities and will possibly need vascular consult.

## 2023-02-20 NOTE — CONSULT NOTE ADULT - SUBJECTIVE AND OBJECTIVE BOX
VASCULAR SURGERY CONSULT    HPI:  72 year old female with PMH of CAD, s/p CABG, afib on eliquis, CVA, carotid stenosis, HTN, hypothyroidism, bipolar and schizophrenia, send from the nursing home for left toe and heel pain. Started about a month ago with the great toe black discoloration, one week ago, she had discoloration of the remaining toes.  Patient is somehow confused, unable to give me full history except for generalized weakness, toe and heel pain, but A&Ox3, she is DNI/DNR.      PAST MEDICAL HISTORY:  Bipolar 1 disorder    Schizophrenia    Hypothyroid    Hypokalemia    Hyperlipidemia    Muscle weakness (generalized)    Intrinsic eczema        PAST SURGICAL HISTORY:  S/P CABG x 3    History of hip replacement        ALLERGIES:  bacitracin (Unknown)  neomycin (Unknown)  Neosporin (Unknown)  polymyxin B ophthalmic (Unknown)  tobramycin (Unknown)      FAMILY HISTORY: Noncontributory    SOCIAL HISTORY: Denies tobacco, EtOH, illicit substance use.     HOME MEDICATIONS:    MEDICATIONS  (STANDING):    MEDICATIONS  (PRN):      VITALS & I/Os:  Vital Signs Last 24 Hrs  T(C): 36.4 (20 Feb 2023 19:32), Max: 36.4 (20 Feb 2023 15:43)  T(F): 97.6 (20 Feb 2023 19:32), Max: 97.6 (20 Feb 2023 19:32)  HR: 76 (20 Feb 2023 19:32) (76 - 90)  BP: 161/79 (20 Feb 2023 19:32) (160/107 - 170/94)  BP(mean): --  RR: 18 (20 Feb 2023 19:32) (18 - 18)  SpO2: 93% (20 Feb 2023 19:32) (92% - 93%)    Parameters below as of 20 Feb 2023 19:32  Patient On (Oxygen Delivery Method): room air      CAPILLARY BLOOD GLUCOSE          I&O's Summary      GENERAL: Alert, manourished  MENTAL STATUS: AAOx3.   RESPIRATORY: unlabored breathing  CARDIOVASCULAR: rrr   GASTROINTESTINAL: Abdomen soft, NT, ND, -R/-G. umbilical erythema, rash  Vascular: palpable femoral BL, doppler signals of right popliteal and right AT  unable to get signals of left popliteal, AT, DP and PT,   Left great toe tip with black discoloration and 4th toe    LABS:                        12.1   10.14 )-----------( 267      ( 20 Feb 2023 16:00 )             37.4     02-20    138  |  101  |  32.9<H>  ----------------------------<  87  4.3   |  22.0  |  0.89    Ca    9.6      20 Feb 2023 16:00    TPro  7.2  /  Alb  3.8  /  TBili  0.3<L>  /  DBili  x   /  AST  42<H>  /  ALT  17  /  AlkPhos  51  02-20    Lactate:    PT/INR - ( 20 Feb 2023 16:00 )   PT: 15.2 sec;   INR: 1.31 ratio         PTT - ( 20 Feb 2023 16:00 )  PTT:41.3 sec              IMAGING:  < from: CT Angio Abd Aorta w/run-off w/ IV Cont (02.20.23 @ 20:03) >  INTERPRETATION:  Diffuse atherosclerotic disease.  Multifocal moderate to   severe stenoses throughout left femoral artery and popliteal artery.    Deep femoral artery is patent.  1 vessel flow via anterior tibial artery.    Trickle flow via posterior tibial and peroneal arteries.  Patent   dorsalis pedis artery.  Trickle flow via PTA.  Multifocal moderate stenoses of right femoral and popliteal arteries    Severe focal stenosis of right tibioperoneal trunk.  3 vessel runoff to  right foot, although there is trickle flow via PTA.  patent right   dorsalis pedis artery.  Official report in AM.    < end of copied text >

## 2023-02-20 NOTE — ED PROVIDER NOTE - PHYSICAL EXAMINATION
General: elderly frail appearing woman, NAD  Head: NC, AT  EENT: EOMI, no scleral icterus  Cardiac: RRR, no apparent murmurs, no lower extremity edema  Respiratory: CTABL, no respiratory distress   Abdomen: soft, ND, NT, nonperitonitic  MSK/Vascular: necrotic appearing LLE digits w/ulcerations, healing ulcer L lateral malleolus, TTP of L calcaneus and toes, palpable but diminished DP pulses LLE, soft compartments, warm extremities  Neuro: AAOx3 but confused, sensation to light touch intact, full neuro exam unable to be performed due to mental status  Psych: calm, tangential at times, unable to cooperate with physical exam

## 2023-02-20 NOTE — ED ADULT NURSE NOTE - OBJECTIVE STATEMENT
Pt BIB EMS from nursing home c/o BL foot pain - hx dementia in no acute distress pending md katherine coleman rn

## 2023-02-21 VITALS
DIASTOLIC BLOOD PRESSURE: 78 MMHG | HEART RATE: 84 BPM | TEMPERATURE: 98 F | OXYGEN SATURATION: 94 % | SYSTOLIC BLOOD PRESSURE: 154 MMHG | RESPIRATION RATE: 18 BRPM

## 2023-10-16 NOTE — PROGRESS NOTE ADULT - ASSESSMENT
68 yr female with Bipolar and schizophrenia , resident of  nursing home  presented with sudden onset slurring of speech started 7 am in the morning of admission and resolved by the time she came to the ED.  Patient is legally blind , has debilitating arthritis that  has confined her to a wheelchair, CAD with CABG 2008, Hypertension, a hysterectomy for uterine cancer.  In ED was found to be in Afib- new onset , Leukocytosis 11.8. CT Angio showing tree in bud opacity of right upper lung concerning for infection or pneumonia.  Head CT show no acute CVA or hemorrhage.  CTA head showing multifocal stenosis intracranial arteries. 75% right carotid bulb stenosis.     Afib  - unknown onest- new to pt history  - TTE still pending  - Plan for possible VAHID cardioversion prior to d/c pending neurology clearance to start on AC  - MRI pending  - continue statin     HTN  - hold antihypertensives for now. Permissive HTN to     HLD  - continue statin Adbry Pregnancy And Lactation Text: It is unknown if this medication will adversely affect pregnancy or breast feeding.

## 2023-11-14 NOTE — H&P ADULT - PROBLEM/PLAN-1
DISPLAY PLAN FREE TEXT Spray Paint Text: The liquid nitrogen was applied to the skin utilizing a spray paint frosting technique. Render Post-Care Instructions In Note?: no Post-Care Instructions: I reviewed with the patient in detail post-care instructions. Patient is to wear sunprotection, and avoid picking at any of the treated lesions. Pt may apply Vaseline to crusted or scabbing areas. Pared With?: curette Show Spray Paint Technique Variable?: Yes Detail Level: Detailed Application Tool (Optional): Liquid Nitrogen Sprayer Consent: The patient's consent was obtained including but not limited to risks of crusting, scabbing, blistering, scarring, darker or lighter pigmentary change, recurrence, incomplete removal and infection. Duration Of Freeze Thaw-Cycle (Seconds): 5-10 Number Of Freeze-Thaw Cycles: 3 freeze-thaw cycles Medical Necessity Clause: This procedure was medically necessary because the lesions that were treated were: Medical Necessity Information: It is in your best interest to select a reason for this procedure from the list below. All of these items fulfill various CMS LCD requirements except the new and changing color options.